# Patient Record
Sex: MALE | Race: WHITE | Employment: OTHER | URBAN - NONMETROPOLITAN AREA
[De-identification: names, ages, dates, MRNs, and addresses within clinical notes are randomized per-mention and may not be internally consistent; named-entity substitution may affect disease eponyms.]

---

## 2021-12-09 ENCOUNTER — TELEPHONE (OUTPATIENT)
Dept: PAIN MANAGEMENT | Age: 56
End: 2021-12-09

## 2021-12-09 RX ORDER — MAGNESIUM OXIDE 400 MG/1
400 TABLET ORAL DAILY
COMMUNITY

## 2021-12-09 RX ORDER — INSULIN GLARGINE 100 [IU]/ML
40 INJECTION, SOLUTION SUBCUTANEOUS DAILY
COMMUNITY

## 2021-12-09 RX ORDER — PANTOPRAZOLE SODIUM 40 MG/1
40 GRANULE, DELAYED RELEASE ORAL
COMMUNITY

## 2021-12-09 RX ORDER — FUROSEMIDE 20 MG/1
20 TABLET ORAL 2 TIMES DAILY
COMMUNITY

## 2021-12-09 RX ORDER — ATORVASTATIN CALCIUM 80 MG/1
80 TABLET, FILM COATED ORAL DAILY
COMMUNITY

## 2021-12-09 RX ORDER — ISOSORBIDE MONONITRATE 60 MG/1
60 TABLET, EXTENDED RELEASE ORAL DAILY
COMMUNITY

## 2021-12-09 RX ORDER — INSULIN ASPART 100 [IU]/ML
12 INJECTION, SUSPENSION SUBCUTANEOUS 3 TIMES DAILY
COMMUNITY

## 2021-12-09 NOTE — TELEPHONE ENCOUNTER
Patient was referred by SUDARSHAN Rebollar CNP for evaluation and treatment of lumbar radiculopathy and lumbar degenerative disc disease. We will have to obtain MRI patient had done at [facility] of Lumbar Spine, as it states in PCP referral note. OAR[place] Report checked for [place], [place], and [place]:12/07/2021 Alprazolam 0.5mg #1.00. Due 12/07/2021. Will call and schedule new patient visit.

## 2021-12-20 ENCOUNTER — OFFICE VISIT (OUTPATIENT)
Dept: PAIN MANAGEMENT | Age: 56
End: 2021-12-20
Payer: MEDICARE

## 2021-12-20 ENCOUNTER — TELEPHONE (OUTPATIENT)
Dept: PAIN MANAGEMENT | Age: 56
End: 2021-12-20

## 2021-12-20 VITALS
BODY MASS INDEX: 25.46 KG/M2 | WEIGHT: 168 LBS | DIASTOLIC BLOOD PRESSURE: 82 MMHG | SYSTOLIC BLOOD PRESSURE: 138 MMHG | HEIGHT: 68 IN

## 2021-12-20 DIAGNOSIS — S39.012A ACUTE MYOFASCIAL STRAIN OF LUMBAR REGION, INITIAL ENCOUNTER: ICD-10-CM

## 2021-12-20 DIAGNOSIS — E11.42 DIABETIC PERIPHERAL NEUROPATHY (HCC): ICD-10-CM

## 2021-12-20 DIAGNOSIS — M51.36 DDD (DEGENERATIVE DISC DISEASE), LUMBAR: ICD-10-CM

## 2021-12-20 DIAGNOSIS — M47.817 LUMBOSACRAL SPONDYLOSIS WITHOUT MYELOPATHY: ICD-10-CM

## 2021-12-20 DIAGNOSIS — M54.16 LUMBAR RADICULOPATHY: Primary | ICD-10-CM

## 2021-12-20 PROCEDURE — 99215 OFFICE O/P EST HI 40 MIN: CPT | Performed by: PHYSICAL MEDICINE & REHABILITATION

## 2021-12-20 PROCEDURE — G8427 DOCREV CUR MEDS BY ELIG CLIN: HCPCS | Performed by: PHYSICAL MEDICINE & REHABILITATION

## 2021-12-20 PROCEDURE — 99204 OFFICE O/P NEW MOD 45 MIN: CPT | Performed by: PHYSICAL MEDICINE & REHABILITATION

## 2021-12-20 PROCEDURE — 3046F HEMOGLOBIN A1C LEVEL >9.0%: CPT | Performed by: PHYSICAL MEDICINE & REHABILITATION

## 2021-12-20 PROCEDURE — G8484 FLU IMMUNIZE NO ADMIN: HCPCS | Performed by: PHYSICAL MEDICINE & REHABILITATION

## 2021-12-20 PROCEDURE — 20552 NJX 1/MLT TRIGGER POINT 1/2: CPT | Performed by: PHYSICAL MEDICINE & REHABILITATION

## 2021-12-20 PROCEDURE — 4004F PT TOBACCO SCREEN RCVD TLK: CPT | Performed by: PHYSICAL MEDICINE & REHABILITATION

## 2021-12-20 PROCEDURE — 2022F DILAT RTA XM EVC RTNOPTHY: CPT | Performed by: PHYSICAL MEDICINE & REHABILITATION

## 2021-12-20 PROCEDURE — 3017F COLORECTAL CA SCREEN DOC REV: CPT | Performed by: PHYSICAL MEDICINE & REHABILITATION

## 2021-12-20 PROCEDURE — G8419 CALC BMI OUT NRM PARAM NOF/U: HCPCS | Performed by: PHYSICAL MEDICINE & REHABILITATION

## 2021-12-20 RX ORDER — METOPROLOL SUCCINATE 25 MG/1
1 TABLET, EXTENDED RELEASE ORAL 2 TIMES DAILY
COMMUNITY
Start: 2021-08-27

## 2021-12-20 ASSESSMENT — ENCOUNTER SYMPTOMS
EYES NEGATIVE: 1
ALLERGIC/IMMUNOLOGIC NEGATIVE: 1
BACK PAIN: 1
CONSTIPATION: 0
NAUSEA: 0
RESPIRATORY NEGATIVE: 1

## 2021-12-20 NOTE — LETTER
[address] A department of [place] [address]  Phone: [phone]  Fax: [phone]    Dianne Judd MD    December 20, 2021     Carlton Greenwood, APRN - CNP  [address]  [address] [address]    Patient: Nhi England   MR Number: [id number]   YOB: 1965   Date of Visit: 12/20/2021       Dear Carlton Greenwood:    Thank you for referring Nhi England to me for evaluation/treatment. Below are the relevant portions of my assessment and plan of care. If you have questions, please do not hesitate to call me. I look forward to following Vimal Grnat along with you.     Sincerely,      Dianne Judd MD

## 2021-12-20 NOTE — TELEPHONE ENCOUNTER
YOHANA with [facility] radiology for most recent MRI results to be faxed to dr. Dillan Jaime.

## 2021-12-20 NOTE — PROGRESS NOTES
PAIN MANAGEMENTNEW PATIENT CONSULTATION  12/20/21    Debby Rubin  1965    ReferringProvider:  No referring provider defined for this encounter. Primary Care Physician:  Mykel LANE, APRN - CNP  [address] [facility] [address]    HPIinformation obtained from the patient as well as the Comprehensive Pain ManagementHealth Questionnaire filled out by the patient. The questionnaire will be scannedinto the electronic chart and PMH, PSH, meds, and allergies will be updates accordingly. Subjective:       CHIEF COMPLAINT:  This is a63 y.o. male patientwho presents with a complaint of New Patient (referred by [employer] for lumbar radiculopathy) and Pain (lower back, right hip and right leg pain. )      PAIN HPI:    Pain in right gluteal for 4 months no new injury   Hurts to stand  Walk    Even lying con hurt      Location: Back  Location Modifier: -  Severity of Pain: 3  Duration of Pain: Intermittent  Frequency of Pain: Intermittent  Aggravating Factors: Stretching, Bending, Straightening, Standing, Walking, Stairs, Exercise, Kneeling and Squatting  Limiting Behavior: no  Relieving Factors: Heat, Cold and Medication -  Result of Injury: no  Work Related Injury: no  [place] of Worker Compensation Case: no      Prior evaluation:    Previous lower back problems:No    Previous workup: No   History of surgery in painful area:No    Previous pain medication trials have included:       Opioids: -     NSAID's:-     Muscle relaxants: -     Neuropathicpain meds: -    Previous Pain Management Physician: No   Nerve blocks, spinal injections:No   Typeof Pain Intervention . Idania Navarrete Date of last Pain Intervention  January /2030   Was there pain relief from Pain Intervention: No.    How long was your pain relief from the Pain Intervention.   Sleep:       Difficultyfalling asleep:  No   Takes sleepingmedication: No    Mental health:    Patient feels - secondary to their current pain problems as described above. H/O depression and anxiety: No   Patient is not seeing psychologist orpsychiatrist   Abuse history? .    Employed? No  Alcohol use?: No  Tobacco use?: No  Marijuana use?: No  Illicit drug use?: No    Imaging: Reviewed available imagingin our system with the patient. No results found. Referring physician records reviewed. Review of Systems   Constitutional: Positive for fatigue. HENT: Negative. Eyes: Negative. Respiratory: Negative. Cardiovascular: Negative. Gastrointestinal: Negative for constipation and nausea. Endocrine: Negative. Genitourinary: Negative for difficulty urinating. Musculoskeletal: Positive for arthralgias, back pain and myalgias. Skin: Negative. Allergic/Immunologic: Negative. Neurological: Positive for weakness and numbness. Hematological: Negative. Psychiatric/Behavioral: Positive for sleep disturbance. All other systems reviewed and are negative. No Known Allergies    Outpatient Medications Prior to Visit   Medication Sig Dispense Refill    metoprolol succinate (TOPROL XL) 25 MG extended release tablet Take 1 tablet by mouth 2 times daily      ASPIRIN 81 PO Take 1 tablet by mouth daily      atorvastatin (LIPITOR) 80 MG tablet Take 80 mg by mouth daily      furosemide (LASIX) 20 MG tablet Take 20 mg by mouth 2 times daily      insulin aspart protamine-insulin aspart (NOVOLOG 70/30 FLEXPEN RELION) (70-30) 100 UNIT/ML injection Inject 6 Units into the skin 3 times daily      insulin glargine (LANTUS) 100 UNIT/ML injection vial Inject 30 Units into the skin daily      isosorbide mononitrate (IMDUR) 60 MG extended release tablet Take 60 mg by mouth daily      magnesium oxide (MAG-OX) 400 MG tablet Take 400 mg by mouth daily      pantoprazole sodium (PROTONIX) 40 MG PACK packet Take 40 mg by mouth every morning (before breakfast)       No facility-administered medications prior to visit.        Past Medical History: Diagnosis Date    Anxiety due to invasive procedure     CAD (coronary artery disease)     [facility]     DDD (degenerative disc disease), lumbar     DM (diabetes mellitus), type 1 with hyperosmolarity ([facility])     HTN (hypertension)     Hyperlipidemia     Ischemic cardiomyopathy     Lumbar radiculopathy     Sciatica        Past Surgical History:   Procedure Laterality Date    CARDIAC SURGERY      S/P Triple Bypass     CARPAL TUNNEL RELEASE      CORONARY ANGIOPLASTY WITH STENT PLACEMENT         Family History   Problem Relation Age of Onset    Heart Disease Mother     Diabetes Father      Social History     Socioeconomic History    Marital status: Single     Spouse name: None    Number of children: None    Years of education: None    Highest education level: None   Occupational History    None   Tobacco Use    Smoking status: Current Every Day Smoker     Packs/day: 1.00     Types: Cigarettes    Smokeless tobacco: Never Used   Substance and Sexual Activity    Alcohol use: Not Currently     Comment: Montly or Less    Drug use: Never    Sexual activity: None   Other Topics Concern    None   Social History Narrative    None     Social Determinants of Health     Financial Resource Strain:     Difficulty of Paying Living Expenses: Not on file   Food Insecurity:     Worried About Running Out of Food in the Last Year: Not on file    Mike of Food in the Last Year: Not on file   Transportation Needs:     Lack of Transportation (Medical): Not on file    Lack of Transportation (Non-Medical):  Not on file   Physical Activity:     Days of Exercise per Week: Not on file    Minutes of Exercise per Session: Not on file   Stress:     Feeling of Stress : Not on file   Social Connections:     Frequency of Communication with Friends and Family: Not on file    Frequency of Social Gatherings with Friends and Family: Not on file    Attends Catholic Services: Not on file   [facility] Active Member of Clubs or Organizations: Not on file    Attends Club or Organization Meetings: Not on file    Marital Status: Not on file   Intimate Partner Violence:     Fear of Current or Ex-Partner: Not on file    Emotionally Abused: Not on file    Physically Abused: Not on file    Sexually Abused: Not on file   Housing Stability:     Unable to Pay for Housing in the Last Year: Not on file    Number of [place] in the Last Year: Not on file    Unstable Housing in the Last Year: Not on file         Objective:     Physical Exam:  Vitals:    12/20/21 1530   BP: 138/82   Weight: 168 lb (76.2 kg)   Height: 5' 8\" (1.727 m)          Physical Exam  Constitutional:       Appearance: He is well-developed. HENT:      Head: Normocephalic and atraumatic. Cardiovascular:      Pulses: Normal pulses. Comments: Warm extremities. Normal capillary refill. Pulmonary:      Effort: Pulmonary effort is normal.   Abdominal:      General: Abdomen is flat. Palpations: Abdomen is soft. Musculoskeletal:      Lumbar back: Negative right straight leg raise test and negative left straight leg raise test.   Skin:     General: Skin is warm and dry. Neurological:      General: No focal deficit present. Mental Status: He is alert and oriented to person, place, and time. Cranial Nerves: No cranial nerve deficit. Sensory: No sensory deficit. Motor: No atrophy or abnormal muscle tone. Deep Tendon Reflexes: Reflexes are normal and symmetric.    Psychiatric:         Mood and Affect: Mood normal.         Speech: Speech normal.         Behavior: Behavior normal.         Back Exam     Tenderness   Back tenderness location: +slump Right  - kemps  Darrel     Range of Motion   Extension: normal   Flexion: normal   Lateral bend right: normal   Lateral bend left: normal   Rotation right: normal   Rotation left: normal     Muscle Strength   Right Quadriceps:  5/5   Left Quadriceps:  5/5   Right Hamstrings: 5/5   Left Hamstrings:  5/5     Tests   Straight leg raise right: negative  Straight leg raise left: negative    Other   Toe walk: normal  Heel walk: normal  Sensation: normal  Gait: normal              Labs: No results found for: WBC, HGB, HCT, PLT, CHOL, TRIG, HDL, LDLDIRECT, ALT, AST, NA, K, CL, CREATININE, BUN, CO2, TSH, PSA, INR, GLUF, LABA1C, LABMICR    Assessment: This is a 61 y.o. male with the following diagnosis:     Pain Diagnoses:  1. Lumbar radiculopathy    2. Lumbosacral spondylosis without myelopathy    3. DDD (degenerative disc disease), lumbar    4. Diabetic peripheral neuropathy [facility])        Medical/ Psychological Comorbidities:  As listed in the past medical and surgical history    Functional Limitations secondary to the above problems:  Chronic painlimits function and quality of life    Plan:     1. obtan Lumbar  To eval severity of Lumbar spinal stenosis,    2. Interventional  Spine Surgeries , more PT  Or Spine Surgeon  eval    Meds:   New Prescriptions    No medications on file      No orders of the defined types were placed in this encounter. Controlled Substances Monitoring:    OARRS report was reviewed for [place], [place]. Pt educated about the risks of taking opiates, including increasedsedation, constipation, slowed breathing, tolerance, dependence, and addiction. No orders of the defined types were placed in this encounter. Return in about 1 week (around 12/27/2021) for intervention procedure R ight L,4 ,L5 TFE . The patient expressed understanding of the above assessment and plan. Totaltime spent face to face with patient was 25 minutes inwhich  50% or more of the time was spent in counseling, education about risk andbenefits of the above plan, and coordination of care.

## 2021-12-20 NOTE — Clinical Note
[address] Pain Management A department of [place] [address]  Phone: [phone]  Fax: [phone]    Trent Seals MD        December 20, 2021     Patient: Cristofer Ames   YOB: 1965   Date of Visit: 12/20/2021       To Whom It May Concern: It is my medical opinion that Cristofer Ames {Work release (duty restriction):62269}. If you have any questions or concerns, please don't hesitate to call.     Sincerely,        Trent Seals MD

## 2021-12-20 NOTE — PROGRESS NOTES
Mansoor Calzada  1965  12/20/21      PAIN MANAGEMENT CLINIC PROCEDURE NOTE    CHIEF COMPLAINT: This is a 61 y.o. male patient who presents to the Pain Management Clinic with a history of pain in the Right gluteal .    PRE-PROCEDURE DIAGNOSIS: Right.lumbar pain myofascial     POST-PROCEDURE DIAGNOSIS: same as pre-procedure diagnosis    Vitals:    12/20/21 1530   BP: 138/82            Trigger points were found in the right:   .5. LUMBAR PARASPINALS  6. GLUTEAL MYOFASCIA  . PROCEDURE:     The procedure was explained, including risks and benefits, and the patient has agreed to proceed. The injection site was marked on the patients skin. A large area around the injection site was cleaned with chlora-prep. Pinky Angles TRIGGER POINT INJECTIONS  A 25G 1.5 inch needle was inserted into each muscle. Depth and direction of the needle were monitored at all times. The needle was advanced until a muscle twitch response was felt and the patient reported concordant pain. The syringe was aspirated and was negative for heme or air. Then, a combination of 1ml of 2%lidocaine, and 10mg of kenalog (NDC# 5234-6003-99) was injected. The needle was then moved in and out of the muscle at the same depth to break up additional muscle spasms. A total of 2 trigger points were injected. .The injection site was cleaned and dressed with a spot band aid. The patient tolerated the procedure well and with out complication The patient was instructed avoid heat and excessive activity for 24-48 hours.

## 2021-12-20 NOTE — PROGRESS NOTES
PAIN MANAGEMENTNEW PATIENT CONSULTATION  12/20/21    Comfort Gallardo  1965    ReferringProvider:  No referring provider defined for this encounter. Primary Care Physician:  Karl LANE, APRN - CNP  [address] [facility] [address]    HPIinformation obtained from the patient as well as the Comprehensive Pain ManagementHealth Questionnaire filled out by the patient. The questionnaire will be scannedinto the electronic chart and PMH, PSH, meds, and allergies will be updates accordingly. Subjective:       CHIEF COMPLAINT:  This is a63 y.o. male patientwho presents with a complaint of New Patient (referred by [employer] for lumbar radiculopathy) and Pain (lower back, right hip and right leg pain. )      PAIN HPI:    jPain in  Right mid gluteal to  Right   Lateral leg to knee   better to  Lay down      Location: Back  Location Modifier: Right  Severity of Pain: 3  Duration of Pain: Intermittent  Frequency of Pain: Intermittent  Aggravating Factors: Stretching, Bending, Straightening, Standing, Walking, Stairs, Exercise, Kneeling and Squatting  Limiting Behavior: yes - . Relieving Factors: Heat, Cold and Medication -  Result of Injury: no  Work Related Injury: no  [place] of Worker Compensation Case: no      Prior evaluation:    Previous lower back problems:No    Previous workup: No   History of surgery in painful area:No    Previous pain medication trials have included:       Opioids: -     NSAID's:-     Muscle relaxants: -     Neuropathicpain meds: -    Previous Pain Management Physician: No   Nerve blocks, spinal injections:No   Typeof Pain Intervention -. Date of last Pain Intervention  January /2030   Was there pain relief from Pain Intervention: No.    How long was your pain relief from the Pain Intervention 3months.      Sleep:       Difficultyfalling asleep:  No   Takes sleepingmedication: No    Mental health:    Patient feels - secondary to their current pain problems as Diagnosis Date    Anxiety due to invasive procedure     CAD (coronary artery disease)     [facility]     DDD (degenerative disc disease), lumbar     DM (diabetes mellitus), type 1 with hyperosmolarity ([facility])     HTN (hypertension)     Hyperlipidemia     Ischemic cardiomyopathy     Lumbar radiculopathy     Sciatica        Past Surgical History:   Procedure Laterality Date    CARDIAC SURGERY      S/P Triple Bypass     CARPAL TUNNEL RELEASE      CORONARY ANGIOPLASTY WITH STENT PLACEMENT         Family History   Problem Relation Age of Onset    Heart Disease Mother     Diabetes Father      Social History     Socioeconomic History    Marital status: Single     Spouse name: None    Number of children: None    Years of education: None    Highest education level: None   Occupational History    None   Tobacco Use    Smoking status: Current Every Day Smoker     Packs/day: 1.00     Types: Cigarettes    Smokeless tobacco: Never Used   Substance and Sexual Activity    Alcohol use: Not Currently     Comment: Montly or Less    Drug use: Never    Sexual activity: None   Other Topics Concern    None   Social History Narrative    None     Social Determinants of Health     Financial Resource Strain:     Difficulty of Paying Living Expenses: Not on file   Food Insecurity:     Worried About Running Out of Food in the Last Year: Not on file    Mike of Food in the Last Year: Not on file   Transportation Needs:     Lack of Transportation (Medical): Not on file    Lack of Transportation (Non-Medical):  Not on file   Physical Activity:     Days of Exercise per Week: Not on file    Minutes of Exercise per Session: Not on file   Stress:     Feeling of Stress : Not on file   Social Connections:     Frequency of Communication with Friends and Family: Not on file    Frequency of Social Gatherings with Friends and Family: Not on file    Attends Hoahaoism Services: Not on file   [place]ristal Cisneros Active Member of Clubs or Organizations: Not on file    Attends Club or Organization Meetings: Not on file    Marital Status: Not on file   Intimate Partner Violence:     Fear of Current or Ex-Partner: Not on file    Emotionally Abused: Not on file    Physically Abused: Not on file    Sexually Abused: Not on file   Housing Stability:     Unable to Pay for Housing in the Last Year: Not on file    Number of [place] in the Last Year: Not on file    Unstable Housing in the Last Year: Not on file         Objective:     Physical Exam:  Vitals:    12/20/21 1530   BP: 138/82   Weight: 168 lb (76.2 kg)   Height: 5' 8\" (1.727 m)          Physical Exam  Constitutional:       Appearance: He is well-developed. HENT:      Head: Normocephalic and atraumatic. Cardiovascular:      Pulses: Normal pulses. Comments: Warm extremities. Normal capillary refill. Pulmonary:      Effort: Pulmonary effort is normal.   Abdominal:      General: Abdomen is flat. Musculoskeletal:      Lumbar back: Negative right straight leg raise test and negative left straight leg raise test.   Skin:     General: Skin is warm and dry. Neurological:      General: No focal deficit present. Mental Status: He is alert and oriented to person, place, and time. Cranial Nerves: No cranial nerve deficit. Sensory: No sensory deficit. Motor: No atrophy or abnormal muscle tone. Deep Tendon Reflexes: Reflexes are normal and symmetric. Psychiatric:         Mood and Affect: Mood normal.         Speech: Speech normal.         Behavior: Behavior normal.         Back Exam     Tenderness   The patient is experiencing tenderness in the lumbar (+slump Right - kemps ).     Range of Motion   Extension: normal   Flexion: normal   Lateral bend right: normal   Lateral bend left: normal   Rotation right: normal   Rotation left: normal     Muscle Strength   Right Quadriceps:  5/5   Left Quadriceps:  5/5   Right Hamstrings:  5/5   Left Hamstrings:  5/5     Tests   Straight leg raise right: negative  Straight leg raise left: negative    Other   Toe walk: normal  Heel walk: normal  Sensation: normal  Gait: normal              Labs: No results found for: WBC, HGB, HCT, PLT, CHOL, TRIG, HDL, LDLDIRECT, ALT, AST, NA, K, CL, CREATININE, BUN, CO2, TSH, PSA, INR, GLUF, LABA1C, LABMICR    Assessment: This is a 61 y.o. male with the following diagnosis:     Pain Diagnoses:  1. Lumbar radiculopathy    2. Lumbosacral spondylosis without myelopathy    3. DDD (degenerative disc disease), lumbar    4. Diabetic peripheral neuropathy [facility])        Medical/ Psychological Comorbidities:  As listed in the past medical and surgical history    Functional Limitations secondary to the above problems:  Chronic painlimits function and quality of life    Plan:   Myofascial injection Right gluteal Deltasone 20 Q D 10 day #10 no refills stop if BS over 200  1. Will order   Right l,4 L5 TFE x2  Meds:   New Prescriptions    No medications on file      No orders of the defined types were placed in this encounter. Controlled Substances Monitoring:    OARRS report was reviewed for [place], [place]. Pt educated about the risks of taking opiates, including increasedsedation, constipation, slowed breathing, tolerance, dependence, and addiction. No orders of the defined types were placed in this encounter. No follow-ups on file. The patient expressed understanding of the above assessment and plan. Totaltime spent face to face with patient was 25 minutes inwhich  50% or more of the time was spent in counseling, education about risk andbenefits of the above plan, and coordination of care.

## 2021-12-30 NOTE — TELEPHONE ENCOUNTER
Called patient   Will keep  Lumbar Lilliana  as scheduled Jan 7 with me   And has Jan 25 Spine Surgeon   OV in  [facility].   Thank You

## 2022-01-07 ENCOUNTER — APPOINTMENT (OUTPATIENT)
Dept: GENERAL RADIOLOGY | Age: 57
End: 2022-01-07
Attending: PHYSICAL MEDICINE & REHABILITATION
Payer: MEDICARE

## 2022-01-07 ENCOUNTER — HOSPITAL ENCOUNTER (OUTPATIENT)
Age: 57
Setting detail: OUTPATIENT SURGERY
Discharge: HOME | End: 2022-01-07
Attending: PHYSICAL MEDICINE & REHABILITATION | Admitting: PHYSICAL MEDICINE & REHABILITATION
Payer: MEDICARE

## 2022-01-07 VITALS
TEMPERATURE: 97.3 F | RESPIRATION RATE: 16 BRPM | BODY MASS INDEX: 25.46 KG/M2 | SYSTOLIC BLOOD PRESSURE: 118 MMHG | WEIGHT: 168 LBS | HEIGHT: 68 IN | HEART RATE: 74 BPM | OXYGEN SATURATION: 96 % | DIASTOLIC BLOOD PRESSURE: 58 MMHG

## 2022-01-07 PROBLEM — M43.06 LUMBAR SPONDYLOLYSIS: Status: ACTIVE | Noted: 2022-01-07

## 2022-01-07 PROBLEM — M54.16 LUMBAR RADICULITIS: Status: ACTIVE | Noted: 2022-01-07

## 2022-01-07 PROCEDURE — 7100000010 HC PHASE II RECOVERY - FIRST 15 MIN: Performed by: PHYSICAL MEDICINE & REHABILITATION

## 2022-01-07 PROCEDURE — 2500000003 HC RX 250 WO HCPCS: Performed by: PHYSICAL MEDICINE & REHABILITATION

## 2022-01-07 PROCEDURE — 64483 NJX AA&/STRD TFRM EPI L/S 1: CPT | Performed by: PHYSICAL MEDICINE & REHABILITATION

## 2022-01-07 PROCEDURE — 6360000004 HC RX CONTRAST MEDICATION: Performed by: PHYSICAL MEDICINE & REHABILITATION

## 2022-01-07 PROCEDURE — 64484 NJX AA&/STRD TFRM EPI L/S EA: CPT | Performed by: PHYSICAL MEDICINE & REHABILITATION

## 2022-01-07 PROCEDURE — 3600000056 HC PAIN LEVEL 4 BASE: Performed by: PHYSICAL MEDICINE & REHABILITATION

## 2022-01-07 PROCEDURE — 3209999900 FLUORO FOR SURGICAL PROCEDURES

## 2022-01-07 PROCEDURE — 2709999900 HC NON-CHARGEABLE SUPPLY: Performed by: PHYSICAL MEDICINE & REHABILITATION

## 2022-01-07 PROCEDURE — 2580000003 HC RX 258: Performed by: PHYSICAL MEDICINE & REHABILITATION

## 2022-01-07 PROCEDURE — 6360000002 HC RX W HCPCS: Performed by: PHYSICAL MEDICINE & REHABILITATION

## 2022-01-07 RX ORDER — DEXAMETHASONE SODIUM PHOSPHATE 10 MG/ML
INJECTION INTRAMUSCULAR; INTRAVENOUS PRN
Status: DISCONTINUED | OUTPATIENT
Start: 2022-01-07 | End: 2022-01-07 | Stop reason: ALTCHOICE

## 2022-01-07 RX ORDER — BUPIVACAINE HYDROCHLORIDE 5 MG/ML
INJECTION, SOLUTION EPIDURAL; INTRACAUDAL PRN
Status: DISCONTINUED | OUTPATIENT
Start: 2022-01-07 | End: 2022-01-07 | Stop reason: ALTCHOICE

## 2022-01-07 RX ORDER — SODIUM CHLORIDE 9 MG/ML
INJECTION INTRAVENOUS PRN
Status: DISCONTINUED | OUTPATIENT
Start: 2022-01-07 | End: 2022-01-07 | Stop reason: ALTCHOICE

## 2022-01-07 ASSESSMENT — PAIN DESCRIPTION - PAIN TYPE: TYPE: SURGICAL PAIN

## 2022-01-07 ASSESSMENT — ENCOUNTER SYMPTOMS
BACK PAIN: 1
NAUSEA: 0
ALLERGIC/IMMUNOLOGIC NEGATIVE: 1
EYES NEGATIVE: 1
CONSTIPATION: 0
RESPIRATORY NEGATIVE: 1

## 2022-01-07 ASSESSMENT — PAIN SCALES - GENERAL: PAINLEVEL_OUTOF10: 4

## 2022-01-07 ASSESSMENT — PAIN DESCRIPTION - ORIENTATION: ORIENTATION: RIGHT

## 2022-01-07 ASSESSMENT — PAIN - FUNCTIONAL ASSESSMENT: PAIN_FUNCTIONAL_ASSESSMENT: 0-10

## 2022-01-07 ASSESSMENT — PAIN DESCRIPTION - LOCATION: LOCATION: BACK

## 2022-01-07 NOTE — H&P
PAIN MANAGEMENTNEW PATIENT CONSULTATION  12/20/21    Ashleemorgan Smith  1965    ReferringProvider:  No referring provider defined for this encounter. Primary Care Physician:  Anjelica LANE, APRN - CNP  [address] [facility] [address]    HPIinformation obtained from the patient as well as the Comprehensive Pain ManagementHealth Questionnaire filled out by the patient. The questionnaire will be scannedinto the electronic chart and PMH, PSH, meds, and allergies will be updates accordingly. Subjective:       CHIEF COMPLAINT:  This is a63 y.o. male patientwho presents with a complaint of No chief complaint on file. PAIN HPI:    jPain in  Right mid gluteal to  Right   Lateral leg to knee   better to  Lay down      Location: Back  Location Modifier: Right  Severity of Pain: 3  Duration of Pain: Intermittent  Frequency of Pain: Intermittent  Aggravating Factors: Stretching, Bending, Straightening, Standing, Walking, Stairs, Exercise, Kneeling and Squatting  Limiting Behavior: yes - . Relieving Factors: Heat, Cold and Medication -  Result of Injury: no  Work Related Injury: no  [place] of Worker Compensation Case: no      Prior evaluation:    Previous lower back problems:No    Previous workup: No   History of surgery in painful area:No    Previous pain medication trials have included:       Opioids: -     NSAID's:-     Muscle relaxants: -     Neuropathicpain meds: -    Previous Pain Management Physician: No   Nerve blocks, spinal injections:No   Typeof Pain Intervention -. Date of last Pain Intervention  January /2030   Was there pain relief from Pain Intervention: No.    How long was your pain relief from the Pain Intervention 3months. Sleep:       Difficultyfalling asleep:  No   Takes sleepingmedication: No    Mental health:    Patient feels - secondary to their current pain problems as described above.    H/O depression and anxiety: No   Patient is not seeing psychologist orpsychiatrist   Abuse history? .    Employed? No  Alcohol use?: No  Tobacco use?: No  Marijuana use?: No  Illicit drug use?: No    Imaging: Reviewed available imagingin our system with the patient. No results found. Referring physician records reviewed. Review of Systems   Constitutional: Positive for fatigue. HENT: Negative. Eyes: Negative. Respiratory: Negative. Cardiovascular: Negative. Gastrointestinal: Negative for constipation and nausea. Endocrine: Negative. Genitourinary: Negative for difficulty urinating. Musculoskeletal: Positive for arthralgias, back pain and myalgias. Skin: Negative. Allergic/Immunologic: Negative. Neurological: Positive for weakness and numbness. Hematological: Negative. Psychiatric/Behavioral: Positive for sleep disturbance. All other systems reviewed and are negative. No Known Allergies    Outpatient Medications Prior to Visit   Medication Sig Dispense Refill    metoprolol succinate (TOPROL XL) 25 MG extended release tablet Take 1 tablet by mouth 2 times daily      ASPIRIN 81 PO Take 1 tablet by mouth daily      atorvastatin (LIPITOR) 80 MG tablet Take 80 mg by mouth daily      furosemide (LASIX) 20 MG tablet Take 20 mg by mouth 2 times daily      insulin aspart protamine-insulin aspart (NOVOLOG 70/30 FLEXPEN RELION) (70-30) 100 UNIT/ML injection Inject 6 Units into the skin 3 times daily      insulin glargine (LANTUS) 100 UNIT/ML injection vial Inject 30 Units into the skin daily      isosorbide mononitrate (IMDUR) 60 MG extended release tablet Take 60 mg by mouth daily      magnesium oxide (MAG-OX) 400 MG tablet Take 400 mg by mouth daily      pantoprazole sodium (PROTONIX) 40 MG PACK packet Take 40 mg by mouth every morning (before breakfast)       No facility-administered medications prior to visit.        Past Medical History:   Diagnosis Date    Anxiety due to invasive procedure     CAD (coronary artery disease)     [facility]  CHF (congestive heart failure) (HCC)     DDD (degenerative disc disease), lumbar     DM (diabetes mellitus), type 1 with hyperosmolarity (HCC)     HTN (hypertension)     Hyperlipidemia     Ischemic cardiomyopathy     Lumbar radiculopathy     Sciatica        Past Surgical History:   Procedure Laterality Date    CARDIAC SURGERY      S/P Triple Bypass     CARPAL TUNNEL RELEASE      CORONARY ANGIOPLASTY WITH STENT PLACEMENT         Family History   Problem Relation Age of Onset    Heart Disease Mother     Diabetes Father      Social History     Socioeconomic History    Marital status: Single     Spouse name: Not on file    Number of children: Not on file    Years of education: Not on file    Highest education level: Not on file   Occupational History    Not on file   Tobacco Use    Smoking status: Current Every Day Smoker     Packs/day: 1.00     Types: Cigarettes    Smokeless tobacco: Never Used   Substance and Sexual Activity    Alcohol use: Not Currently     Comment: [place] or Less    Drug use: Never    Sexual activity: Not on file   Other Topics Concern    Not on file   Social History Narrative    Not on file     Social Determinants of Health     Financial Resource Strain:     Difficulty of Paying Living Expenses: Not on file   Food Insecurity:     Worried About Running Out of Food in the Last Year: Not on file    Mike of Food in the Last Year: Not on file   Transportation Needs:     Lack of Transportation (Medical): Not on file    Lack of Transportation (Non-Medical):  Not on file   Physical Activity:     Days of Exercise per Week: Not on file    Minutes of Exercise per Session: Not on file   Stress:     Feeling of Stress : Not on file   Social Connections:     Frequency of Communication with Friends and Family: Not on file    Frequency of Social Gatherings with Friends and Family: Not on file    Attends Protestant Services: Not on file   [employer] of Clubs or Organizations: Not on file    Attends Club or Organization Meetings: Not on file    Marital Status: Not on file   Intimate Partner Violence:     Fear of Current or Ex-Partner: Not on file    Emotionally Abused: Not on file    Physically Abused: Not on file    Sexually Abused: Not on file   Housing Stability:     Unable to Pay for Housing in the Last Year: Not on file    Number of [place] in the Last Year: Not on file    Unstable Housing in the Last Year: Not on file         Objective:     Physical Exam:  Vitals:    01/07/22 1013   BP: 117/60   Pulse: 78   Resp: 16   Temp: 98.3 °F (36.8 °C)   TempSrc: Oral   SpO2: 95%   Weight: 168 lb (76.2 kg)   Height: 5' 8\" (1.727 m)          Physical Exam  Constitutional:       Appearance: He is well-developed. HENT:      Head: Normocephalic and atraumatic. Cardiovascular:      Pulses: Normal pulses. Comments: Warm extremities. Normal capillary refill. Pulmonary:      Effort: Pulmonary effort is normal.   Abdominal:      General: Abdomen is flat. Musculoskeletal:      Lumbar back: Negative right straight leg raise test and negative left straight leg raise test.   Skin:     General: Skin is warm and dry. Neurological:      General: No focal deficit present. Mental Status: He is alert and oriented to person, place, and time. Cranial Nerves: No cranial nerve deficit. Sensory: No sensory deficit. Motor: No atrophy or abnormal muscle tone. Deep Tendon Reflexes: Reflexes are normal and symmetric. Psychiatric:         Mood and Affect: Mood normal.         Speech: Speech normal.         Behavior: Behavior normal.         Back Exam     Tenderness   The patient is experiencing tenderness in the lumbar (+slump Right - kemps ).     Range of Motion   Extension: normal   Flexion: normal   Lateral bend right: normal   Lateral bend left: normal   Rotation right: normal   Rotation left: normal     Muscle Strength   Right Quadriceps:  5/5 Left Quadriceps:  5/5   Right Hamstrings:  5/5   Left Hamstrings:  5/5     Tests   Straight leg raise right: negative  Straight leg raise left: negative    Other   Toe walk: normal  Heel walk: normal  Sensation: normal  Gait: normal              Labs: No results found for: WBC, HGB, HCT, PLT, CHOL, TRIG, HDL, LDLDIRECT, ALT, AST, NA, K, CL, CREATININE, BUN, CO2, TSH, PSA, INR, GLUF, LABA1C, LABMICR    Assessment: This is a 61 y.o. male with the following diagnosis:     Pain Diagnoses:  No diagnosis found. Medical/ Psychological Comorbidities:  As listed in the past medical and surgical history    Functional Limitations secondary to the above problems:  Chronic painlimits function and quality of life    Plan:   Myofascial injection Right gluteal Deltasone 20 Q D 10 day #10 no refills stop if BS over 200  1. Will order   Right l,4 L5 TFE x2  Meds:   Current Discharge Medication List         No orders of the defined types were placed in this encounter. Controlled Substances Monitoring:    OARRS report was reviewed for [place], [place]. Pt educated about the risks of taking opiates, including increasedsedation, constipation, slowed breathing, tolerance, dependence, and addiction.     Orders Placed This Encounter   Procedures    FLUORO FOR SURGICAL PROCEDURES     Standing Status:   Standing     Number of Occurrences:   1     Order Specific Question:   Reason for exam:     Answer:   Fluro    Diet NPO Exceptions are: Sips of Water with Meds     Standing Status:   Standing     Number of Occurrences:   1     Order Specific Question:   Exceptions are     Answer:   Sips of Water with Meds    Vital signs per unit routine     Standing Status:   Standing     Number of Occurrences:   1    Notify physician for     Notify physician for pulse less than 50 or greater than 120, respiratory rate less than 12 or greater than 25, temperature greater than 101.3 F (44.0 C), systolic BP less than 90 or greater than 407, diastolic BP less than 50 or greater than 100. Standing Status:   Standing     Number of Occurrences:   1    Up as tolerated     Standing Status:   Standing     Number of Occurrences:   31202    Up with assistance     Standing Status:   Standing     Number of Occurrences:   79789    Weigh patient     Standing Status:   Standing     Number of Occurrences:   1    Verify surgical site confirmation documentation completed     Standing Status:   Standing     Number of Occurrences:   1    Verify discontinuation of anticoagulants/antiplatelets unless otherwise specified by physician     Standing Status:   Standing     Number of Occurrences:   1    Void on call to OR     Standing Status:   Standing     Number of Occurrences:   1    Verify informed consent     Standing Status:   Standing     Number of Occurrences:   1    Verify pre-procedure history and physical completed     Standing Status:   Standing     Number of Occurrences:   1    Full Code     Standing Status:   Standing     Number of Occurrences:   1    Initiate Oxygen Therapy Protocol     Initiate oxygen therapy if the patient has 1) SpO2 is less than 90%, 2) Cyanosis, Chest Pain, Dyspnea, or Altered level of consciousness AND SpO2 checked and it is less than 90%, or 3) patient on Home oxygen. To initiate oxygen therapy: nurse enters RT51 Nasal cannula oxygen order using Per Protocol order mode (if indication Home Oxygen then change L/min to same amount at home and change Wean to Room Air to No). Notify provider if initiate oxygen therapy unless already on Home Oxygen.          Standing Status:   Standing     Number of Occurrences:   4    Pulse Oximetry Spot Check     Standing Status:   Standing     Number of Occurrences:   1    Blood glucose - POCT     If the patient is diabetic     Standing Status:   Standing     Number of Occurrences:   1    Pregnancy, urine POCT     Standing Status:   Standing     Number of Occurrences:   1     No follow-ups on file. The patient expressed understanding of the above assessment and plan. Totaltime spent face to face with patient was 25 minutes inwhich  50% or more of the time was spent in counseling, education about risk andbenefits of the above plan, and coordination of care.

## 2022-01-07 NOTE — INTERVAL H&P NOTE
I have interviewed and examined the patient and reviewed the recent History and Physical.  There have been no changes to the recent H&P documentation. The surgical consent form has been signed. Last anticoagulant medication use was:3-5 days    Premedication taken for contrast allergy? No    Valium taken for oral sedation? No    Outpatient Medications Marked as Taking for the 1/7/22 encounter [facility] Encounter)   Medication Sig Dispense Refill    metoprolol succinate (TOPROL XL) 25 MG extended release tablet Take 1 tablet by mouth 2 times daily      furosemide (LASIX) 20 MG tablet Take 20 mg by mouth 2 times daily      isosorbide mononitrate (IMDUR) 60 MG extended release tablet Take 60 mg by mouth daily      magnesium oxide (MAG-OX) 400 MG tablet Take 400 mg by mouth daily      pantoprazole sodium (PROTONIX) 40 MG PACK packet Take 40 mg by mouth every morning (before breakfast)         The patient understands the planned operation and its associated risks and benefits and agrees to proceed.         Electronically signed by Marie Milan MD on 1/7/2022 at 10:23 AM

## 2022-01-07 NOTE — OP NOTE
TRANSFORAMINAL EPIDURAL STEROID INJECTION    1/7/22  The patient was counseled at length about the risks of evelio Covid-19 during their perioperative period and any recovery window from their procedure. The patient was made aware that evelio Covid-19  may worsen their prognosis for recovering from their procedure  and lend to a higher morbidity and/or mortality risk. All material risks, benefits, and reasonable alternatives including postponing the procedure were discussed. The patient does wish to proceed with the procedure at this time. Surgeon: Arpita Minaya MD    Pre-operative Diagnosis:   Hospital Problems           Last Modified POA    * (Principal) Lumbar radiculitis 1/7/2022 Yes    Lumbar spondylolysis 1/7/2022 Yes          Post-operative Diagnosis: Same    Assistants: none    This is a 61 y.o. male patient with pain in the Back, right leg. Previous treatment and examination findings are noted in the H&P.R L4,5  transforaminal epidural injection has been requested for diagnostic and therapeutic reasons. Conservative treatment was ineffective i.e.: ice, NSAIDS, rest, narcotic medication, chiropractic care, physical therapy and message therapy. Patient is unable to perform the following ADL's: ambulating and grooming     Pain Assessment: 0-10  Pain Level: 7        Pain Location: Back       Last Plain films: 2020      EXAMINATION:  1. RL 4,5 transforaminal radiculogram/epidurogram.   2. RL4,5 transforaminal epidural anesthetic injection. 3. RL4,5 transforaminal epidural steroid injection. CONSENT: Written consent was obtained from the patient on preprinted consent form after explaining the procedure, indications, potential complications and outcomes. Alternative treatments were also discussed. DISCUSSION: The patient was sterilely prepped and draped in the usual fashion in the prone position. Time out was verified for correct patient, side, level and procedure.     SEDATION: No conscious sedation was performed during the procedure. The patient remained awake and conversed throughout the procedure. The patient underwent pulse oximetry and blood pressure monitoring independently by a trained observer, as well as by a physician. PROCEDURE:  Under image-intensifier control, a 22 gauge needle x 5 inch spinal needle was guided successfully into the epidural space employing a posterior lateral/oblique approach. Needle aspiration was negative for heme or CSF. Instillation of  .5 mL of Omnipaque 240 contrast medium opacified the spinal nerve and demonstrated contiguous flow into the  RL 4 epidural space. No vascular spread was noted. Digital subtraction was not employed to evaluate for vascular spread. The patient was monitored for any untoward reaction to contrast medium before proceeding with procedure #2. The patient did not report pain reproduction in a concordant distribution. Following needle position verification, a test dose of .5 mL of sterile lidocaine 0.5% was administered and patient monitored for any adverse effects. Then, 1 mL of   was instilled into the epidural space and the patient's response was again monitored. Finally, Dexamethasone (Decadron 10 mg/mL) 1 ml of 0.5% bupivacaine was then instilled. The patient's response was again monitored. The spinal needle was removed. Instillation of  .5 mL of Omnipaque 240 contrast medium opacified the spinal nerve and demonstrated contiguous flow into the  RL  5 epidural space. No vascular spread was noted. Digital subtraction was not employed to evaluate for vascular spread. The patient was monitored for any untoward reaction to contrast medium before proceeding with procedure #2. The patient did not report pain reproduction in a concordant distribution. Following needle position verification, a test dose of .5 mL of sterile lidocaine 0.5% was administered and patient monitored for any adverse effects.  Then, 1 mL of   was instilled into the epidural space and the patient's response was again monitored. Finally, Dexamethasone (Decadron 10 mg/mL) 1 ml of 0.5% bupivacaine was then instilled. The patient's response was again monitored. The spinal needle was removed. The patient tolerated the procedure well and without complications and was noted to be in stable condition prior to discharge from the procedure center with discharge instructions. EBL: no blood loss    SPECIMEN: none    IMPRESSIONS:  1. R L4,5 transforaminal epidurogram, epidural anesthesia and epidural steroid injection procedures accomplished without incident. RECOMMENDATIONS:  1. Complete and return Post-Procedure Pain and Activity Diary.   2. Contact the [address] for symptom exacerbation, fever or unusual symptoms. 3. Post-procedure care according to verbal and written discharge instructions    POST-PROCEDURE EPIDUROGRAPHY INTERPRETATION:    EXAMINATION: AP, lateral, and oblique views    FLUORO TIME: 21 seconds    DISCUSSION: Spot views of the spine reveal normal alignment and segmentation. Spinal needle is positioned at the RL4,5 neuroforamin. Contrast spreads and outlines the RL4,5 nerve/ neuroforamin and epidural space. The epidurogram reveals excellent contrast flow. Visualized spine reveals See radiology report. Soft tissues reveal no abnormalities. IMPRESSION: RL4,5 transforaminal epidurogram/epineurogram reveals satisfactory needle position and contrast spread.      Electronically signed by Tiffany Mahmood MD on 1/7/2022 at 10:24 AM

## 2022-01-20 ENCOUNTER — OFFICE VISIT (OUTPATIENT)
Dept: PAIN MANAGEMENT | Age: 57
End: 2022-01-20
Payer: MEDICARE

## 2022-01-20 VITALS
SYSTOLIC BLOOD PRESSURE: 124 MMHG | HEIGHT: 68 IN | DIASTOLIC BLOOD PRESSURE: 64 MMHG | WEIGHT: 167 LBS | BODY MASS INDEX: 25.31 KG/M2

## 2022-01-20 DIAGNOSIS — M51.36 DDD (DEGENERATIVE DISC DISEASE), LUMBAR: Primary | ICD-10-CM

## 2022-01-20 DIAGNOSIS — M43.06 LUMBAR SPONDYLOLYSIS: ICD-10-CM

## 2022-01-20 DIAGNOSIS — M54.16 LUMBAR RADICULITIS: ICD-10-CM

## 2022-01-20 PROCEDURE — 4004F PT TOBACCO SCREEN RCVD TLK: CPT | Performed by: NURSE PRACTITIONER

## 2022-01-20 PROCEDURE — G8427 DOCREV CUR MEDS BY ELIG CLIN: HCPCS | Performed by: NURSE PRACTITIONER

## 2022-01-20 PROCEDURE — 99215 OFFICE O/P EST HI 40 MIN: CPT | Performed by: NURSE PRACTITIONER

## 2022-01-20 PROCEDURE — 3017F COLORECTAL CA SCREEN DOC REV: CPT | Performed by: NURSE PRACTITIONER

## 2022-01-20 PROCEDURE — G8484 FLU IMMUNIZE NO ADMIN: HCPCS | Performed by: NURSE PRACTITIONER

## 2022-01-20 PROCEDURE — G8419 CALC BMI OUT NRM PARAM NOF/U: HCPCS | Performed by: NURSE PRACTITIONER

## 2022-01-20 PROCEDURE — 99214 OFFICE O/P EST MOD 30 MIN: CPT | Performed by: NURSE PRACTITIONER

## 2022-01-20 RX ORDER — IBUPROFEN 800 MG/1
800 TABLET ORAL EVERY 6 HOURS PRN
Qty: 120 TABLET | Refills: 3 | Status: SHIPPED | OUTPATIENT
Start: 2022-01-20 | End: 2022-05-09

## 2022-01-20 ASSESSMENT — ENCOUNTER SYMPTOMS
GASTROINTESTINAL NEGATIVE: 1
BACK PAIN: 1
RESPIRATORY NEGATIVE: 1

## 2022-01-20 NOTE — PATIENT INSTRUCTIONS
[facility]  [address]  Telephone [phone]  Fax [phone]      PROCEDURE INSTRUCTIONS FOR  PAIN MANAGEMENT PROCEDURES WITHOUT IV SEDATION    Beatriz Nielsen scheduled to see Dr. Ca Goetz to undergo the following procedure:  Right L4 L5 Transforaminal    Procedure Date: ____1/28/22____  You will receive a phone call the day prior to your procedure to confirm a time of arrival.    Report to the [place] [id number], Registration office on the 1st floor in the hospital, after check in and signing of paper work you will then go to the second floor to the surgery center. 1. Stop the following medications prior to the procedure:    Aspirin 81 mg   Stop blood thinners as directed before the injection, with permission from your cardiologist or primary care physician. We will send a letter to them requesting permission to hold the blood thinners. · Medication___Aspirin 81 mg ___ held for __3-5__ days    If you take Warfarin (Coumadin), you must have your blood drawn for an INR the day before the procedure. INR must be less than 1.5. if not complete prior to check in the procedure this will be drawn at the procedure center prior to having the procedure completed. 2.  Take all routine medications unless otherwise instructed. Ok to take vitamins and antiinflammatory medications    3. EATING & DRINKING:  Ok to eat or drink before the injection - no IV sedation will be used. 4. If you are allergic to contrast or iodine, you must take benadryl and prednisone prior to the injection to prevent an allergic reaction. Follow the directions on the prescription for the times to take the medication. 5. Oral valium can be prescribed if your are anxious about the injections or feel that you can not lay still during the injection. If you take valium, you must have a . Make arrangements for a family member or friend to drive you to the surgery center.   Your [facility] @ [facility]    Aicha Cruz DNP, ANP-BC  Subjective/HPI:     Leny Ordaz is a 39 y.o. female with history of hypertension, migraines with syncope, pseudoseizure, lupus was recently hospitalized for seizure-like activity is here for routine f/u. The patient denies chest pain/ shortness of breath, orthopnea, PND, LE edema, palpitations. Recent medication adjustment by neurology for migraines was taken off of metoprolol placed on propranolol feels that she is doing better with it, blood pressure seems controlled. She has not gone back to driving since her hospitalization. She has also been experiencing some lower extremity edema this has been a waxing and waning symptom however she feels it is more pronounced now. Noting some fluctuations in weight on vital sign flowsheet. I had previously used as needed furosemide with some success on her. She has not used it in quite some time. Specifically denies any type of orthopnea or PND. Echocardiogram in hospital demonstrated normal systolic function. 8/28/21 Hospitalist D/C Note   Raisa Marshall is a 36 y.o.   female who presents with recurrent headache behind her right eye for more than 2 weeks and questionable multiple  ?seizures 3 witnessed by EMS and 1 witnessed in the ED. Per ED staff patient had 15 second seizure witnessed by staff, MD. Nakita Pederson ED MD he thinks that was  not real seizure and  likely she had pseudoseizure but not sure.  Of note patient was seen on 8/27/2020  in the ED for headache and recurrent syncope describing going in and out of consciousness and on 8/24/2021 for headache . She had right greater occipital nerve block on 824/2021 and right supraorbital nerve block on 8/27/2021 and was also started on steroid on 8/27/2021 in the ED visit. Patient has a past medical history of lupus, breast cancer, nephrolithiasis, GERD, colitis, Mariah-Danlos syndrome.     Pseudoseizures ruled out ride must stay in the hospital while you are having the injection done. If they cannot stay, the injection will be rescheduled. The valium may affect your judgment following the procedure and driving a vehicle within 24 hours after the sedation could be dangerous. 6.  Wear simple loose clothing, which can be easily changed. 7.  Leave jewelry (including rings) and other valuables at home. 8.  You will be asked to sign several forms prior to surgery; patients under the age of 25 must have a parent or legal guardian sign the permit to be able to do the procedure. 9.  You must have finished any antibiotic prescribed for recent infections. If required, please take pre-procedure antibiotic or other pre-procedure medications as instructed. 10. Bring inhalers and pain medications with you to your procedure. 11. Bring your MRI/CT films if they were done outside of the [facility]. 12. If you should develop a cold, sore throat, cough, fever or other new indication of illness or infection, or are started on antibiotics within 2 weeks of the scheduled procedure, please notify the [facility] office as early as possible at [phone]. If calling after 4:30pm the day prior to your scheduled procedure please contact [phone] and Leave a Voice Message. seizure   History of Migraines with Syncope   Anxiety/Doression   History of Drug Seeking per chart review   -Patient presented with syncope and reports seizure like activity. Seizure like activity was witnessed by neurology. Seizure like activity was not rhythmic and ceased with distraction.  -Recently been seen by neurology and ophthalmology OP to work up uncontrolled migraines. -MRI and EEG were both WNL   -Appreciate Neurology input - No need for keppra, recommended follow up with primary neurologist and psychiatry   -Discussed follow up with psychiatry with patient, she seemed agreeable. -Continue tizanidine, cymbalta and gabapentin as PTA   Leukocytosis with Recent Diagnosis of Occipital Neurology Leukocytosis associated with recent prednisone with taper. Recommended to continue to follow up with ophthalmologist   History of Lupus   Hypertension   Morbid Obesity      ECHO 8/2021  Interpretation Summary    · LV: Estimated LVEF is 55 - 60%. Normal cavity size, wall thickness and systolic function (ejection fraction normal). Age-appropriate left ventricular diastolic function. Comparison Study Information    Prior Study    There is a prior study available for comparison. Prior study date: 7/28/2020. Echo Findings    Left Ventricle Normal cavity size, wall thickness and systolic function (ejection fraction normal). The estimated EF is 55 - 60%. There is age-appropriate left ventricular diastolic function. Left Atrium Normal cavity size. Right Ventricle Normal cavity size and global systolic function. Right Atrium Normal cavity size. Aortic Valve Normal valve structure, no stenosis and no regurgitation. Mitral Valve Normal valve structure and no stenosis. Trace regurgitation. Tricuspid Valve Normal valve structure and no stenosis. Trace regurgitation. Pulmonic Valve Normal valve structure, no stenosis and no regurgitation. Aorta Normal aortic root, ascending aortic, and aortic arch. Pulmonary Artery Pulmonary hypertension not suggested by Doppler findings. IVC/Hepatic Veins Inferior vena cava not well visualized. Pericardium Insignificant pericardial effusion or fat. Event monitor 8/2020  Results   Arrhthymias:normal     Symptom Correlation:   Cp/lightheadedness/heart racing do not correlate with any arrhythmias     Comments:   Sinus rhythm with symptoms that do not correlate with any arrhythmias        Stress ECHO 7/2020  Interpretation Summary      · Baseline ECG: Normal sinus rhythm. · Negative stress test.  · Normal stress echocardiogram. Low risk study. · Low risk [facility] treadmill score.      2/2020 ECHO  Echo Findings      Left Ventricle  Normal cavity size and systolic function (ejection fraction normal). Mild concentric hypertrophy .  False tendon is noted. The estimated ejection fraction is 60 - 65%. LV wall motion is noted to be no regional wall motion abnormality. There is mild (grade 1) left ventricular diastolic dysfunction. Left Atrium  Normal cavity size. Right Ventricle  Normal cavity size and global systolic function. Right Atrium  Normal cavity size. Aortic Valve  Aortic valve not well visualized. No stenosis and no regurgitation. Mitral Valve  Normal valve structure, no stenosis and no regurgitation. Tricuspid Valve  Tricuspid valve not well visualized. No stenosis and no regurgitation. There is no evidence of pulmonary hypertension. Pulmonic Valve  Pulmonic valve not well visualized. No stenosis and no regurgitation. Aorta  Normal aortic root and ascending aorta.     Pericardium  No evidence of pericardial effusion.           PCP Provider  Asa Fernandez MD  Past Medical History:   Diagnosis Date    Abnormal CT of the abdomen 11/8/2012    Asthma     Autoimmune disease (HCC)     lupus    C. difficile diarrhea     Cervical prolapse     Dehydration     Mariah-Danlos syndrome     Gastrointestinal disorder     gerd, twisted colon, IBS  GERD (gastroesophageal reflux disease)     Headache(784.0)     Lupus (HCC)     Morbid obesity (HCC)     Nausea & vomiting     Neurological disorder     cluster headaches    Occipital neuralgia     other 2006, 2009    ovarian cyst removal    Other ill-defined conditions(799.89)     Worsening headaches       Past Surgical History:   Procedure Laterality Date    COLONOSCOPY  9/21/2010         HX CHOLECYSTECTOMY      HX CYST INCISION AND DRAINAGE Right 02/27/2020    HX GYN  1/2009    right salpingo oopherectomy    HX GYN  2006    right ovarian tumor removed    HX HEENT      left wisdom teeth removal    HX HEENT      top right wisdom tooth removed    HX HERNIA REPAIR  4/2012    HX HERNIA REPAIR  2/28/13    Laparoscopic recurrent incisional hernia repair    HX HYSTERECTOMY      2016    HX UROLOGICAL      Kidney Stone Removal    ID EGD TRANSORAL BIOPSY SINGLE/MULTIPLE  9/22/2010          Allergies   Allergen Reactions    Latex Itching     burning    Compazine [Prochlorperazine] Anxiety     High heart rate  Pt can take promethazine with no problems    Sulfa (Sulfonamide Antibiotics) Unknown (comments)    Topamax [Topiramate] Unknown (comments)    Adhesive Rash     Allergic to Dermabond    Clindamycin Diarrhea     Pt states caused her C-Diff    Mushroom Other (comments)    Mushroom Combination No.1 Unknown (comments)    Toradol [Ketorolac Tromethamine] Nausea and Vomiting and Other (comments)     PO & IV causes GI bleed  Tolerated during Feb 2020 stay    Valproic Acid Other (comments)     Elevated heart rate and vomiting        Family History   Problem Relation Age of Onset    Colon Cancer Maternal Grandfather       Current Outpatient Medications   Medication Sig    albuterol (PROVENTIL HFA, VENTOLIN HFA, PROAIR HFA) 90 mcg/actuation inhaler Take 1 Puff by inhalation every six (6) hours as needed for Wheezing.     predniSONE (DELTASONE) 10 mg tablet Take 60 mg by mouth daily (with breakfast). Day 1 and 2: 60mg; Day 3: 50mg; Day 4:40mg; Day 5: 30 mg; Day 6: 20mg; Day 7: 10mg    gabapentin (NEURONTIN) 100 mg capsule TAKE 2 CAPSULES BY MOUTH 3 TIMES A DAY    propranolol LA (INDERAL LA) 80 mg SR capsule 80 mg daily.  codeine-butalbital-acetaminophen-caffeine (FIORICET WITH CODEINE) -15-30 mg capsule Take 1 Capsule by mouth every six (6) hours as needed for Headache.  furosemide (LASIX) 20 mg tablet Take 20 mg by mouth as needed (swelling).  tiZANidine (ZANAFLEX) 4 mg tablet TAKE 1 TABLET BY MOUTH 3 TIMES A DAY AS NEEDED FOR PAIN    mirtazapine (REMERON) 30 mg tablet TAKE 1 TABLET BY MOUTH AT BEDTIME    ALPRAZolam (XANAX) 1 mg tablet One bid prn anxiety    ondansetron (Zofran ODT) 4 mg disintegrating tablet 1 Tablet by SubLINGual route every eight (8) hours as needed for Nausea or Vomiting.  DULoxetine (CYMBALTA) 60 mg capsule TAKE 2 CAPSULESBY MOUTH EVERY DAY    naloxone (NARCAN) 4 mg/actuation nasal spray Use 1 spray intranasally, then discard. Repeat with new spray every 2 min as needed for opioid overdose symptoms, alternating nostrils. (Patient not taking: Reported on 8/29/2021)    pantoprazole (PROTONIX) 40 mg tablet take 1 tablet by mouth at bedtime    rizatriptan (MAXALT) 10 mg tablet Take 10 mg by mouth once as needed for Migraine. May repeat in 2 hours if needed     belimumab (Benlysta) 400 mg solr injection by IntraVENous route. No current facility-administered medications for this visit. There were no vitals filed for this visit.   Social History     Socioeconomic History    Marital status: LEGALLY      Spouse name: Not on file    Number of children: Not on file    Years of education: Not on file    Highest education level: Not on file   Occupational History    Not on file   Tobacco Use    Smoking status: Never Smoker    Smokeless tobacco: Never Used   Vaping Use    Vaping Use: Never used   Substance and Sexual Activity    Alcohol use: No    Drug use: No    Sexual activity: Not Currently     Partners: Male     Birth control/protection: Abstinence   Other Topics Concern    Not on file   Social History Narrative    Not on file     Social Determinants of Health     Financial Resource Strain:     Difficulty of Paying Living Expenses:    Food Insecurity:     Worried About Running Out of Food in the Last Year:     [address] in the Last Year:    Transportation Needs:     Lack of Transportation (Medical):  Lack of Transportation (Non-Medical):    Physical Activity:     Days of Exercise per Week:     Minutes of Exercise per Session:    Stress:     Feeling of Stress :    Social Connections:     Frequency of Communication with Friends and Family:     Frequency of Social Gatherings with Friends and Family:     Attends Voodoo Services:     Active Member of Clubs or Organizations:     Attends Club or Organization Meetings:     Marital Status:    Intimate Partner Violence:     Fear of Current or Ex-Partner:     Emotionally Abused:     Physically Abused:     Sexually Abused:        I have reviewed the nurses notes, vitals, problem list, allergy list, medical history, family, social history and medications. Review of Symptoms:  11 systems reviewed, negative other than as stated in the HPI      Physical Exam:      General: Well developed, in no acute distress, cooperative and alert  HEENT: No carotid bruits, no JVD, trach is midline. Neck Supple, PERRL, EOM intact. Heart:  Normal S1/S2 negative S3 or S4. Regular, no murmur, gallop or rub. Respiratory: Clear bilaterally x 4, no wheezing or rales  Abdomen:   Soft, non-tender, no masses, bowel sounds are active. Extremities: 1+ Pitting ankle edema, normal cap refill, no cyanosis, atraumatic. Neuro: A&Ox3, speech clear, gait stable. Skin: Skin color is normal. No rashes or lesions.  Non diaphoretic  Vascular: 2+ pulses symmetric in all extremities    Cardiographics    EC2021 ER EKG        Cardiology Labs:  No results found for: CHOL, CHOLX, CHLST, CHOLV, [id number], HDL, HDLP, LDL, LDLC, DLDLP, TGLX, TRIGL, TRIGP, [facility], [facility]    Lab Results   Component Value Date/Time    Sodium 141 2021 04:35 AM    Potassium 3.9 2021 04:35 AM    Chloride 108 2021 04:35 AM    CO2 29 2021 04:35 AM    Anion gap 4 (L) 2021 04:35 AM    Glucose 125 (H) 2021 04:35 AM    BUN 19 2021 04:35 AM    Creatinine 0.90 2021 04:35 AM    BUN/Creatinine ratio 21 (H) 2021 04:35 AM    GFR est AA >60 2021 04:35 AM    GFR est non-AA >60 2021 04:35 AM    Calcium 8.8 2021 04:35 AM    Bilirubin, total 0.2 2021 04:35 AM    Alk. phosphatase 98 2021 04:35 AM    Protein, total 6.8 2021 04:35 AM    Albumin 3.3 (L) 2021 04:35 AM    Globulin 3.5 2021 04:35 AM    A-G Ratio 0.9 (L) 2021 04:35 AM    ALT (SGPT) 17 2021 04:35 AM           Assessment:     Assessment:     Diagnoses and all orders for this visit:    1. Essential hypertension        ICD-10-CM ICD-9-CM    1. Essential hypertension  I10 401.9      No orders of the defined types were placed in this encounter. Plan:   1. Hypertension: Controlled 119/80 heart rate stable continue propanolol 80 mg daily  2. Lower extremity edema: Negative proteinuria on hospital UA, normal renal function, abdominal CT  no suspicion for vena cava compression/obstruction, normal ejection fraction. Will check proBNP to rule out diastolic heart failure. Venous duplex and reflux study. If negative for DVT which is low suspicion and normal proBNP will then recommend compression stockings if no resolution will then have her follow-up with vascular. Follow-up in 1 year. Tristian Lozano NP      Please note that this dictation was completed with [employer], the computer voice recognition software.   Quite often unanticipated grammatical, syntax, homophones, and other interpretive errors are inadvertently transcribed by the computer software. Please disregard these errors. Please excuse any errors that have escaped final proofreading. Thank you.

## 2022-01-20 NOTE — PROGRESS NOTES
Subjective:      Patient ID: Chip Felton is a 61 y.o. male. Chief Complaint   Patient presents with    Follow-up     post TFE    Pain     right lower back and down leg. HPI   Post injection follow up    Pain Assessment  Location of Pain: Back  Location Modifiers: Right,Posterior,Inferior (radiates down right hip to leg. )  Severity of Pain: 8  Quality of Pain: Aching,Other (Comment) (burn)  Duration of Pain: Persistent  Frequency of Pain: Constant  Aggravating Factors:  Other (Comment),Standing (sitting, weather)  Limiting Behavior: Yes  Relieving Factors: Rest    No Known Allergies    Outpatient Medications Marked as Taking for the 1/20/22 encounter (Office Visit) with SUDARSHAN Aguilar CNP   Medication Sig Dispense Refill    ibuprofen (ADVIL;MOTRIN) 800 MG tablet Take 1 tablet by mouth every 6 hours as needed for Pain 120 tablet 3    metoprolol succinate (TOPROL XL) 25 MG extended release tablet Take 1 tablet by mouth 2 times daily      ASPIRIN 81 PO Take 1 tablet by mouth daily      atorvastatin (LIPITOR) 80 MG tablet Take 80 mg by mouth daily      furosemide (LASIX) 20 MG tablet Take 20 mg by mouth 2 times daily      insulin aspart protamine-insulin aspart (NOVOLOG 70/30 FLEXPEN RELION) (70-30) 100 UNIT/ML injection Inject 8 Units into the skin 3 times daily       insulin glargine (LANTUS) 100 UNIT/ML injection vial Inject 40 Units into the skin daily       isosorbide mononitrate (IMDUR) 60 MG extended release tablet Take 60 mg by mouth daily      magnesium oxide (MAG-OX) 400 MG tablet Take 400 mg by mouth daily      pantoprazole sodium (PROTONIX) 40 MG PACK packet Take 40 mg by mouth every morning (before breakfast)         Past Medical History:   Diagnosis Date    Anxiety due to invasive procedure     CAD (coronary artery disease)     [facility]    [facility]estive [facility] failure) [facility]     DDD (degenerative disc disease), lumbar     DM (diabetes mellitus), type 1 [facility] hyperos[facility]olarity [facility]     HTN (hypertension)     Hyperlipidemia     Ischemic cardiomyopathy     Lumbar radiculopathy     Sciatica        Past Surgical History:   Procedure Laterality Date    CARDIAC SURGERY      S/P Triple Bypass     CARPAL TUNNEL RELEASE      CORONARY ANGIOPLASTY WITH STENT PLACEMENT      PAIN MANAGEMENT PROCEDURE Right 1/7/2022    Right L4 L5 TRANSFORAMINAL performed by Nancy Evans MD at [facility] OR       Family History   Problem Relation Age of Onset    Heart Disease Mother     Diabetes Father        Social History     Socioeconomic History    Marital status: Single     Spouse name: None    Number of children: None    Years of education: None    Highest education level: None   Occupational History    None   Tobacco Use    Smoking status: Current Every Day Smoker     Packs/day: 1.00     Types: Cigarettes    Smokeless tobacco: Never Used   Substance and Sexual Activity    Alcohol use: Not Currently     Comment: Montly or Less    Drug use: Never    Sexual activity: None   Other Topics Concern    None   Social History Narrative    None     Social Determinants of Health     Financial Resource Strain:     Difficulty of Paying Living Expenses: Not on file   Food Insecurity:     Worried About Running Out of Food in the Last Year: Not on file    Mike of Food in the Last Year: Not on file   Transportation Needs:     Lack of Transportation (Medical): Not on file    Lack of Transportation (Non-Medical):  Not on file   Physical Activity:     Days of Exercise per Week: Not on file    Minutes of Exercise per Session: Not on file   Stress:     Feeling of Stress : Not on file   Social Connections:     Frequency of Communication with Friends and Family: Not on file    Frequency of Social Gatherings with Friends and Family: Not on file    Attends Zoroastrian Services: Not on file    Active Member of Clubs or Organizations: Not on file    Attends Club or Organization Meetings: Not on file   Jeanne Daley Marital Status: Not on file   Intimate Partner Violence:     Fear of Current or Ex-Partner: Not on file    Emotionally Abused: Not on file    Physically Abused: Not on file    Sexually Abused: Not on file   Housing Stability:     Unable to Pay for Housing in the Last Year: Not on file    Number of [place] in the Last Year: Not on file    Unstable Housing in the Last Year: Not on file     Review of Systems   Constitutional: Negative for activity change. Respiratory: Negative. Cardiovascular: Negative. Gastrointestinal: Negative. Genitourinary: Negative. Musculoskeletal: Positive for arthralgias, back pain and myalgias. Skin: Negative. Neurological: Positive for weakness and numbness. Hematological: Does not bruise/bleed easily. Psychiatric/Behavioral: Positive for sleep disturbance. Objective:   Physical Exam  Vitals reviewed. Constitutional:       General: He is awake. He is not in acute distress. Appearance: Normal appearance. He is well-developed. He is not ill-appearing, toxic-appearing or diaphoretic. HENT:      Head: Normocephalic and atraumatic. Eyes:      General: Lids are normal.   Pulmonary:      Effort: Pulmonary effort is normal. No tachypnea, bradypnea, accessory muscle usage, prolonged expiration, respiratory distress or retractions. Abdominal:      General: Abdomen is flat. Palpations: Abdomen is soft. Musculoskeletal:      Lumbar back: Positive right straight leg raise test.   Skin:     General: Skin is warm and dry. Capillary Refill: Capillary refill takes less than 2 seconds. Coloration: Skin is not ashen, jaundiced or pale. Findings: No rash. Nails: There is no clubbing. Neurological:      Mental Status: He is alert and oriented to person, place, and time. Cranial Nerves: No cranial nerve deficit.    Psychiatric:         Attention and Perception: Attention normal.         Mood and Affect: Mood normal. Speech: Speech normal.         Behavior: Behavior is cooperative. Cognition and Memory: Cognition normal.         Judgment: Judgment normal.         Assessment / Plan:      Diagnosis Orders   1. DDD (degenerative disc disease), lumbar     2. Lumbar radiculitis     3. Lumbar spondylolysis       Schedule repeat right L4, L5 transforaminal    Informed consent has not been obtained for procedure. [place]  on blood thinners. Medications to hold have been reviewed with patient. Blood thinners must be held with permission from your cardiologist or primary care physician. A letter is  required to besent to PCP/Cardiologist regarding holding medications for procedure to decrease bleeding risk.

## 2022-01-28 ENCOUNTER — APPOINTMENT (OUTPATIENT)
Dept: GENERAL RADIOLOGY | Age: 57
End: 2022-01-28
Attending: PHYSICAL MEDICINE & REHABILITATION
Payer: MEDICARE

## 2022-01-28 ENCOUNTER — HOSPITAL ENCOUNTER (OUTPATIENT)
Age: 57
Setting detail: OUTPATIENT SURGERY
Discharge: HOME | End: 2022-01-28
Attending: PHYSICAL MEDICINE & REHABILITATION | Admitting: PHYSICAL MEDICINE & REHABILITATION
Payer: MEDICARE

## 2022-01-28 VITALS
HEIGHT: 68 IN | HEART RATE: 76 BPM | TEMPERATURE: 98.6 F | RESPIRATION RATE: 16 BRPM | BODY MASS INDEX: 27.28 KG/M2 | OXYGEN SATURATION: 99 % | DIASTOLIC BLOOD PRESSURE: 86 MMHG | WEIGHT: 180 LBS | SYSTOLIC BLOOD PRESSURE: 138 MMHG

## 2022-01-28 PROCEDURE — 3600000056 HC PAIN LEVEL 4 BASE: Performed by: PHYSICAL MEDICINE & REHABILITATION

## 2022-01-28 PROCEDURE — 2709999900 HC NON-CHARGEABLE SUPPLY: Performed by: PHYSICAL MEDICINE & REHABILITATION

## 2022-01-28 PROCEDURE — 3209999900 FLUORO FOR SURGICAL PROCEDURES

## 2022-01-28 PROCEDURE — 2580000003 HC RX 258: Performed by: PHYSICAL MEDICINE & REHABILITATION

## 2022-01-28 PROCEDURE — 64484 NJX AA&/STRD TFRM EPI L/S EA: CPT | Performed by: PHYSICAL MEDICINE & REHABILITATION

## 2022-01-28 PROCEDURE — A4216 STERILE WATER/SALINE, 10 ML: HCPCS | Performed by: PHYSICAL MEDICINE & REHABILITATION

## 2022-01-28 PROCEDURE — 2500000003 HC RX 250 WO HCPCS: Performed by: PHYSICAL MEDICINE & REHABILITATION

## 2022-01-28 PROCEDURE — 6360000004 HC RX CONTRAST MEDICATION: Performed by: PHYSICAL MEDICINE & REHABILITATION

## 2022-01-28 PROCEDURE — 7100000010 HC PHASE II RECOVERY - FIRST 15 MIN: Performed by: PHYSICAL MEDICINE & REHABILITATION

## 2022-01-28 PROCEDURE — 6360000002 HC RX W HCPCS: Performed by: PHYSICAL MEDICINE & REHABILITATION

## 2022-01-28 PROCEDURE — 64483 NJX AA&/STRD TFRM EPI L/S 1: CPT | Performed by: PHYSICAL MEDICINE & REHABILITATION

## 2022-01-28 RX ORDER — BUPIVACAINE HYDROCHLORIDE 5 MG/ML
INJECTION, SOLUTION EPIDURAL; INTRACAUDAL PRN
Status: DISCONTINUED | OUTPATIENT
Start: 2022-01-28 | End: 2022-01-28 | Stop reason: ALTCHOICE

## 2022-01-28 RX ORDER — SODIUM CHLORIDE 9 MG/ML
INJECTION INTRAVENOUS PRN
Status: DISCONTINUED | OUTPATIENT
Start: 2022-01-28 | End: 2022-01-28 | Stop reason: ALTCHOICE

## 2022-01-28 RX ORDER — DEXAMETHASONE SODIUM PHOSPHATE 10 MG/ML
INJECTION INTRAMUSCULAR; INTRAVENOUS PRN
Status: DISCONTINUED | OUTPATIENT
Start: 2022-01-28 | End: 2022-01-28 | Stop reason: ALTCHOICE

## 2022-01-28 ASSESSMENT — PAIN - FUNCTIONAL ASSESSMENT: PAIN_FUNCTIONAL_ASSESSMENT: 0-10

## 2022-01-28 ASSESSMENT — ENCOUNTER SYMPTOMS
GASTROINTESTINAL NEGATIVE: 1
RESPIRATORY NEGATIVE: 1
BACK PAIN: 1

## 2022-01-28 ASSESSMENT — PAIN SCALES - GENERAL: PAINLEVEL_OUTOF10: 0

## 2022-01-28 ASSESSMENT — PAIN DESCRIPTION - DESCRIPTORS: DESCRIPTORS: ACHING

## 2022-01-28 NOTE — H&P
Subjective:      Patient ID: Abi Hmam is a 61 y.o. male. No chief complaint on file.     HPI   Post injection follow up         No Known Allergies    Outpatient Medications Marked as Taking for the 1/28/22 encounter [facility] Encounter)   Medication Sig Dispense Refill    ibuprofen (ADVIL;MOTRIN) 800 MG tablet Take 1 tablet by mouth every 6 hours as needed for Pain 120 tablet 3    atorvastatin (LIPITOR) 80 MG tablet Take 80 mg by mouth daily      furosemide (LASIX) 20 MG tablet Take 20 mg by mouth 2 times daily      insulin aspart protamine-insulin aspart (NOVOLOG 70/30 FLEXPEN RELION) (70-30) 100 UNIT/ML injection Inject 8 Units into the skin 3 times daily       insulin glargine (LANTUS) 100 UNIT/ML injection vial Inject 40 Units into the skin daily       isosorbide mononitrate (IMDUR) 60 MG extended release tablet Take 60 mg by mouth daily      magnesium oxide (MAG-OX) 400 MG tablet Take 400 mg by mouth daily      pantoprazole sodium (PROTONIX) 40 MG PACK packet Take 40 mg by mouth every morning (before breakfast)         Past Medical History:   Diagnosis Date    Anxiety due to invasive procedure     [facility]onary artery disease)     [facility]    CHF [facility]congest[facility] heart failure) ([facility] [address]     DDD (degenerative disc disease), lumbar     DM (diabetes mellitus), type 1 with hyperosmolarity (HCC)     HTN (hypertension)     Hyperlipidemia     Ischemic cardiomyopathy     Lumbar radiculopathy     Sciatica        Past Surgical History:   Procedure Laterality Date    CARDIAC SURGERY      S/P Triple Bypass     CARPAL TUNNEL RELEASE      CORONARY ANGIOPLASTY WITH STENT PLACEMENT      PAIN MANAGEMENT PROCEDURE Right 1/7/2022    Right L4 L5 TRANSFORAMINAL performed by Jesusita Parikh MD at [facility]       Family History   Problem Relation Age of Onset    Heart Disease Mother     Diabetes Father        Social History     Socioeconomic History    Marital status: Single     Spouse name: None    Number of children: None    Years of education: None    Highest education level: None   Occupational History    None   Tobacco Use    Smoking status: Current Every Day Smoker     Packs/day: 1.00     Types: Cigarettes    Smokeless tobacco: Never Used   Substance and Sexual Activity    Alcohol use: Not Currently     Comment: Montly or Less    Drug use: Never    Sexual activity: None   Other Topics Concern    None   Social History Narrative    None     Social Determinants of Health     Financial Resource Strain:     Difficulty of Paying Living Expenses: Not on file   Food Insecurity:     Worried About Running Out of Food in the Last Year: Not on file    Mike of Food in the Last Year: Not on file   Transportation Needs:     Lack of Transportation (Medical): Not on file    Lack of Transportation (Non-Medical): Not on file   Physical Activity:     Days of Exercise per Week: Not on file    Minutes of Exercise per Session: Not on file   Stress:     Feeling of Stress : Not on file   Social Connections:     Frequency of Communication with Friends and Family: Not on file    Frequency of Social Gatherings with Friends and Family: Not on file    Attends Protestant Services: Not on file    Active Member of [address] [place] [address] or Organizations: Not on file    Attends Club or Organization Meetings: Not on file    Marital Status: Not on file   Intimate Partner Violence:     Fear of Current or Ex-Partner: Not on file    Emotionally Abused: Not on file    Physically Abused: Not on file    Sexually Abused: Not on file   Housing Stability:     Unable to Pay for Housing in the Last Year: Not on file    Number of [place] in the Last Year: Not on file    Unstable Housing in the Last Year: Not on file     Review of Systems   Constitutional: Negative for activity change. Respiratory: Negative. Cardiovascular: Negative. Gastrointestinal: Negative. Genitourinary: Negative.     Musculoskeletal: Positive for arthralgias, back pain and myalgias. Skin: Negative. Neurological: Positive for weakness and numbness. Hematological: Does not bruise/bleed easily. Psychiatric/Behavioral: Positive for sleep disturbance. Objective:   Physical Exam  Vitals reviewed. Constitutional:       General: He is awake. He is not in acute distress. Appearance: Normal appearance. He is well-developed. He is not ill-appearing, toxic-appearing or diaphoretic. HENT:      Head: Normocephalic and atraumatic. Eyes:      General: Lids are normal.   Pulmonary:      Effort: Pulmonary effort is normal. No tachypnea, bradypnea, accessory muscle usage, prolonged expiration, respiratory distress or retractions. Abdominal:      General: Abdomen is flat. Palpations: Abdomen is soft. Musculoskeletal:      Lumbar back: Positive right straight leg raise test.   Skin:     General: Skin is warm and dry. Capillary Refill: Capillary refill takes less than 2 seconds. Coloration: Skin is not ashen, jaundiced or pale. Findings: No rash. Nails: There is no clubbing. Neurological:      Mental Status: He is alert and oriented to person, place, and time. Cranial Nerves: No cranial nerve deficit. Psychiatric:         Attention and Perception: Attention normal.         Mood and Affect: Mood normal.         Speech: Speech normal.         Behavior: Behavior is cooperative. Cognition and Memory: Cognition normal.         Judgment: Judgment normal.         Assessment / Plan:      Diagnosis Orders   1. DDD (degenerative disc disease), lumbar     2. Lumbar radiculitis     3. Lumbar spondylolysis       Schedule repeat right L4, L5 transforaminal    Informed consent has not been obtained for procedure. Imelda Meyer  on blood thinners. Medications to hold have been reviewed with patient. Blood thinners must be held with permission from your cardiologist or primary care physician.  A letter is  required to besent to PCP/Cardiologist regarding holding medications for procedure to decrease bleeding risk.

## 2022-01-28 NOTE — OP NOTE
Rubio Albright TRANSFORAMINAL EPIDURAL STEROID INJECTION    1/28/22  The patient was counseled at length about the risks of evelio Covid-19 during their perioperative period and any recovery window from their procedure. The patient was made aware that evelio Covid-19  may worsen their prognosis for recovering from their procedure  and lend to a higher morbidity and/or mortality risk. All material risks, benefits, and reasonable alternatives including postponing the procedure were discussed. The patient does wish to proceed with the procedure at this time. Surgeon: Oral Green MD    Pre-operative Diagnosis:   Hospital Problems           Last Modified POA    * (Principal) Lumbar radiculitis 1/28/2022 Yes    Lumbar spondylolysis 1/28/2022 Yes          Post-operative Diagnosis: Same    Assistants: none    This is a 61 y.o. male patient with pain in the Back, right leg. Previous treatment and examination findings are noted in the H&P. Right L4,L5 transforaminal epidural injection has been requested for diagnostic and therapeutic reasons. Conservative treatment was ineffective i.e.: ice, NSAIDS, rest, narcotic medication, chiropractic care, physical therapy and message therapy. Patient is unable to perform the following ADL's: personal cares and ambulating     Pain Assessment: 0-10  Pain Level: 7     Pain Orientation: Right  Pain Location: Back  Pain Descriptors: Aching    Last Plain films: 2020      EXAMINATION:  1. R L4,5 transforaminal radiculogram/epidurogram.   2. Rl4,5 transforaminal epidural anesthetic injection. 3. R L4,5 transforaminal epidural steroid injection. CONSENT: Written consent was obtained from the patient on preprinted consent form after explaining the procedure, indications, potential complications and outcomes. Alternative treatments were also discussed. DISCUSSION: The patient was sterilely prepped and draped in the usual fashion in the prone position.  Time out was verified for correct patient, side, level and procedure. SEDATION:   No conscious sedation was performed during the procedure. The patient remained awake and conversed throughout the procedure. The patient underwent pulse oximetry and blood pressure monitoring independently by a trained observer, as well as by a physician. PROCEDURE:  Under image-intensifier control, a 22 gauge needle x 5 inch spinal needle was guided successfully into the epidural space employing a posterior lateral/oblique approach. Needle aspiration was negative for heme or CSF. Instillation of  .5 mL of Omnipaque 240 contrast medium opacified the spinal nerve and demonstrated contiguous flow into the  RL 4epidural space. No vascular spread was noted. Digital subtraction was not employed to evaluate for vascular spread. The patient was monitored for any untoward reaction to contrast medium before proceeding with procedure #2. The patient did not report pain reproduction in a concordant distribution. Following needle position verification, a test dose of .5 mL of sterile lidocaine 0.5% was administered and patient monitored for any adverse effects. Then, 1 mL of   was instilled into the epidural space and the patient's response was again monitored. Finally, Dexamethasone (Decadron 10 mg/mL) 1 ml of 0.5% bupivacaine was then instilled. The patient's response was again monitored. The spinal needle was removed. Instillation of  .5 mL of Omnipaque 240 contrast medium opacified the spinal nerve and demonstrated contiguous flow into the  RL 5epidural space. No vascular spread was noted. Digital subtraction was not employed to evaluate for vascular spread. The patient was monitored for any untoward reaction to contrast medium before proceeding with procedure #2. The patient did not report pain reproduction in a concordant distribution.     Following needle position verification, a test dose of .5 mL of sterile lidocaine 0.5% was administered and patient monitored for any adverse effects. Then, 1 mL of   was instilled into the epidural space and the patient's response was again monitored. Finally, Dexamethasone (Decadron 10 mg/mL) 1 ml of 0.5% bupivacaine was then instilled. The patient's response was again monitored. The spinal needle was removed    The patient tolerated the procedure well and without complications and was noted to be in stable condition prior to discharge from the procedure center with discharge instructions. EBL: no blood loss    SPECIMEN: none    IMPRESSIONS:  1. R L4,5 transforaminal epidurogram, epidural anesthesia and epidural steroid injection procedures accomplished without incident. RECOMMENDATIONS:  1. Complete and return Post-Procedure Pain and Activity Diary.   2. Contact the [address] for symptom exacerbation, fever or unusual symptoms. 3. Post-procedure care according to verbal and written discharge instructions    POST-PROCEDURE EPIDUROGRAPHY INTERPRETATION:    EXAMINATION: AP, lateral, and oblique views    FLUORO TIME: 13 seconds    DISCUSSION: Spot views of the spine reveal normal alignment and segmentation. Spinal needle is positioned at the RL 4,5 neuroforamin. Contrast spreads and outlines the RL4,5 nerve/ neuroforamin and epidural space. The epidurogram reveals excellent contrast flow. Visualized spine reveals See radiology report. Soft tissues reveal no abnormalities. IMPRESSION: RL 4,5 transforaminal epidurogram/epineurogram reveals satisfactory needle position and contrast spread.      Electronically signed by Madelin Lopes MD on 1/28/2022 at 10:01 AM

## 2022-01-28 NOTE — INTERVAL H&P NOTE
I have interviewed and examined the patient and reviewed the recent History and Physical.  There have been no changes to the recent H&P documentation. The surgical consent form has been signed. Last anticoagulant medication use was:5 days    Premedication taken for contrast allergy? No    Valium taken for oral sedation? No    Outpatient Medications Marked as Taking for the 1/28/22 encounter [facility] Encounter)   Medication Sig Dispense Refill    ibuprofen (ADVIL;MOTRIN) 800 MG tablet Take 1 tablet by mouth every 6 hours as needed for Pain 120 tablet 3    atorvastatin (LIPITOR) 80 MG tablet Take 80 mg by mouth daily      furosemide (LASIX) 20 MG tablet Take 20 mg by mouth 2 times daily      insulin aspart protamine-insulin aspart (NOVOLOG 70/30 FLEXPEN RELION) (70-30) 100 UNIT/ML injection Inject 8 Units into the skin 3 times daily       insulin glargine (LANTUS) 100 UNIT/ML injection vial Inject 40 Units into the skin daily       isosorbide mononitrate (IMDUR) 60 MG extended release tablet Take 60 mg by mouth daily      magnesium oxide (MAG-OX) 400 MG tablet Take 400 mg by mouth daily      pantoprazole sodium (PROTONIX) 40 MG PACK packet Take 40 mg by mouth every morning (before breakfast)         The patient understands the planned operation and its associated risks and benefits and agrees to proceed.         Electronically signed by Wilfredo Robbins MD on 1/28/2022 at 9:54 AM

## 2022-02-03 ENCOUNTER — VIRTUAL VISIT (OUTPATIENT)
Dept: PAIN MANAGEMENT | Age: 57
End: 2022-02-03
Payer: MEDICARE

## 2022-02-03 DIAGNOSIS — M54.06 PANNICULITIS INVOLVING LUMBAR REGION: Primary | ICD-10-CM

## 2022-02-03 DIAGNOSIS — M51.36 DDD (DEGENERATIVE DISC DISEASE), LUMBAR: ICD-10-CM

## 2022-02-03 DIAGNOSIS — M47.816 LUMBAR FACET JOINT SYNDROME: ICD-10-CM

## 2022-02-03 PROCEDURE — 99211 OFF/OP EST MAY X REQ PHY/QHP: CPT

## 2022-02-03 PROCEDURE — G8427 DOCREV CUR MEDS BY ELIG CLIN: HCPCS | Performed by: NURSE PRACTITIONER

## 2022-02-03 PROCEDURE — 99214 OFFICE O/P EST MOD 30 MIN: CPT | Performed by: NURSE PRACTITIONER

## 2022-02-03 PROCEDURE — 3017F COLORECTAL CA SCREEN DOC REV: CPT | Performed by: NURSE PRACTITIONER

## 2022-02-03 ASSESSMENT — ENCOUNTER SYMPTOMS
GASTROINTESTINAL NEGATIVE: 1
RESPIRATORY NEGATIVE: 1
BACK PAIN: 1

## 2022-02-03 NOTE — PROGRESS NOTES
Subjective:      Patient ID: Shubham Rene is a 61 y.o. male. No chief complaint on file. HPI   Post injection follow up, right L4, L5 x2 with relief. Continues to complain back pain, worse in cold. No Known Allergies    No outpatient medications have been marked as taking for the 2/3/22 encounter (Appointment) with SUDARSHAN Ly CNP.        Past Medical History:   Diagnosis Date    Anxiety due to invasive procedure     [facility]ary artery disease)     [facility]    CHF (congestive heart failure) ([facility])     DDD (degenerative disc disease), lumbar     DM (diabetes mellitus), type 1 with hyperosmolarity ([facility])     HTN (hypertension)     Hyperlipidemia     Ischemic cardiomyopathy     Lumbar radiculopathy     Sciatica        Past Surgical History:   Procedure Laterality Date    CARDIAC SURGERY      S/P Triple Bypass     CARPAL TUNNEL RELEASE      CORONARY ANGIOPLASTY WITH STENT PLACEMENT      PAIN MANAGEMENT PROCEDURE Right 1/7/2022    Right L4 L5 TRANSFORAMINAL performed by Asad Meneses MD at [facility] Right 1/28/2022    Right L4 L5  TRANSFORAMINAL performed by Asad Meneses MD at [address] [place] [address] OR       Family History   Problem Relation Age of Onset    Heart Disease Mother     Diabetes Father        Social History     Socioeconomic History    Marital status: Single     Spouse name: None    Number of children: None    Years of education: None    Highest education level: None   Occupational History    None   Tobacco Use    Smoking status: Current Every Day Smoker     Packs/day: 1.00     Types: Cigarettes    Smokeless tobacco: Never Used   Substance and Sexual Activity    Alcohol use: Not Currently     Comment: Montly or Less    Drug use: Never    Sexual activity: None   Other Topics Concern    None   Social History Narrative    None     Social Determinants of Health     Financial Resource Strain:     Difficulty of Paying Living Expenses: Not on file Food Insecurity:     Worried About Running Out of Food in the Last Year: Not on file    Mike of Food in the Last Year: Not on file   Transportation Needs:     Lack of Transportation (Medical): Not on file    Lack of Transportation (Non-Medical): Not on file   Physical Activity:     Days of Exercise per Week: Not on file    Minutes of Exercise per Session: Not on file   Stress:     Feeling of Stress : Not on file   Social Connections:     Frequency of Communication with Friends and Family: Not on file    Frequency of Social Gatherings with Friends and Family: Not on file    Attends Yazidi Services: Not on file    Active Member of [address] [place] [address] [employer] or Organizations: Not on file    Attends Club or Organization Meetings: Not on file    Marital Status: Not on file   Intimate Partner Violence:     Fear of Current or Ex-Partner: Not on file    Emotionally Abused: Not on file    Physically Abused: Not on file    Sexually Abused: Not on file   Housing Stability:     Unable to Pay for Housing in the Last Year: Not on file    Number of [place] in the Last Year: Not on file    Unstable Housing in the Last Year: Not on file     Review of Systems   Constitutional: Negative for activity change. Respiratory: Negative. Cardiovascular: Negative. Gastrointestinal: Negative. Genitourinary: Negative. Musculoskeletal: Positive for arthralgias, back pain and myalgias. Skin: Negative. Neurological: Positive for weakness and numbness. Hematological: Does not bruise/bleed easily. Psychiatric/Behavioral: Positive for sleep disturbance. Objective:   Physical Exam  Vitals reviewed. Constitutional:       General: He is awake. He is not in acute distress. Appearance: Normal appearance. He is well-developed. He is not ill-appearing, toxic-appearing or diaphoretic. HENT:      Head: Normocephalic and atraumatic.    Eyes:      General: Lids are normal.   Pulmonary:      Effort: Pulmonary effort is normal. No tachypnea, bradypnea, accessory muscle usage, prolonged expiration, respiratory distress or retractions. Abdominal:      General: Abdomen is flat. Palpations: Abdomen is soft. Musculoskeletal:      Lumbar back: Positive right straight leg raise test.   Skin:     General: Skin is warm and dry. Capillary Refill: Capillary refill takes less than 2 seconds. Coloration: Skin is not ashen, jaundiced or pale. Findings: No rash. Nails: There is no clubbing. Neurological:      Mental Status: He is alert and oriented to person, place, and time. Cranial Nerves: No cranial nerve deficit. Psychiatric:         Attention and Perception: Attention normal.         Mood and Affect: Mood normal.         Speech: Speech normal.         Behavior: Behavior is cooperative. Cognition and Memory: Cognition normal.         Judgment: Judgment normal.         Assessment / Plan:      Diagnosis Orders   1. Panniculitis involving lumbar region     2. Lumbar facet joint syndrome     3. DDD (degenerative disc disease), lumbar       Schedule right L4/L5 and L5/S1 diagnostic MBB    Informed consent has not been obtained for procedure. Papa Sutherland  on blood thinners. Medications to hold have been reviewed with patient. Blood thinners must be held with permission from your cardiologist or primary care physician. A letter is not  required to besent to PCP/Cardiologist regarding holding medications for procedure to decrease bleeding risk.

## 2022-02-03 NOTE — Clinical Note
Schedule right L4/L5 and L5/S1 diagnostic MBB, same sedation as he had with [facility] NEUROSURGERY POST OP NOTE:    S/P Lumbar 4/5 5/Sacral 1 Transforaminal lumbar interbody fusion with pedicle screw fixation  Pt seen and examined at bedside. Pt reports back pain incisional pain  denies radicular pain or parasthesia           T(C): 36.7 (12-09-19 @ 17:08), Max: 36.7 (12-09-19 @ 16:08)  HR: 89 (12-09-19 @ 17:38) (83 - 116)  BP: 121/74 (12-09-19 @ 17:38) (103/81 - 162/85)  RR: 10 (12-09-19 @ 17:38) (8 - 18)  SpO2: 97% (12-09-19 @ 17:38) (97% - 100%)      12-09-19 @ 07:01  -  12-09-19 @ 17:49  --------------------------------------------------------  IN: 180 mL / OUT: 165 mL / NET: 15 mL        acetaminophen   Tablet .. 650 milliGRAM(s) Oral every 6 hours PRN  acetaminophen  IVPB .. 1000 milliGRAM(s) IV Intermittent once  ALPRAZolam 1 milliGRAM(s) Oral daily PRN  ceFAZolin   IVPB 1000 milliGRAM(s) IV Intermittent every 8 hours  cyclobenzaprine 10 milliGRAM(s) Oral three times a day PRN  dextrose 5% + sodium chloride 0.45%. 1000 milliLiter(s) IV Continuous <Continuous>  gabapentin 400 milliGRAM(s) Oral three times a day  hydrochlorothiazide 12.5 milliGRAM(s) Oral daily  HYDROmorphone   Tablet 2 milliGRAM(s) Oral every 3 hours PRN  HYDROmorphone  Injectable 0.5 milliGRAM(s) IV Push every 10 minutes PRN  ketorolac   Injectable 15 milliGRAM(s) IV Push once PRN  lactated ringers. 1000 milliLiter(s) IV Continuous <Continuous>  lisinopril 20 milliGRAM(s) Oral daily  morphine  - Injectable 4 milliGRAM(s) IV Push every 3 hours PRN  ondansetron Injectable 4 milliGRAM(s) IV Push every 6 hours PRN  ondansetron Injectable 4 milliGRAM(s) IV Push once PRN  oxycodone    5 mG/acetaminophen 325 mG 1 Tablet(s) Oral every 4 hours PRN  oxycodone    5 mG/acetaminophen 325 mG 2 Tablet(s) Oral every 6 hours PRN  senna 2 Tablet(s) Oral at bedtime  sertraline 150 milliGRAM(s) Oral daily      Exam:  A&Ox3 NAD speech clear  PERRLA EOMI  Incision with mild staining  Motor 5/5 x 4 with slight decrease proximal L/E B/L 2/2 back pain  sensory intact to L/T  symetrical DTRs        DRAINS:  N/A        Assessment: S/P L4/5 5/1 TLIF w/PSF      Plan: Stable  Pain Control  Serial Neuro Checks  PT/Rehab  TLSO brace  D/W Attending

## 2022-02-11 ENCOUNTER — APPOINTMENT (OUTPATIENT)
Dept: GENERAL RADIOLOGY | Age: 57
End: 2022-02-11
Attending: PHYSICAL MEDICINE & REHABILITATION
Payer: MEDICARE

## 2022-02-11 ENCOUNTER — HOSPITAL ENCOUNTER (OUTPATIENT)
Age: 57
Setting detail: OUTPATIENT SURGERY
Discharge: HOME | End: 2022-02-11
Attending: PHYSICAL MEDICINE & REHABILITATION | Admitting: PHYSICAL MEDICINE & REHABILITATION
Payer: MEDICARE

## 2022-02-11 VITALS
TEMPERATURE: 97 F | BODY MASS INDEX: 28.87 KG/M2 | SYSTOLIC BLOOD PRESSURE: 155 MMHG | OXYGEN SATURATION: 93 % | HEART RATE: 92 BPM | WEIGHT: 190.5 LBS | RESPIRATION RATE: 18 BRPM | DIASTOLIC BLOOD PRESSURE: 73 MMHG | HEIGHT: 68 IN

## 2022-02-11 PROCEDURE — 3209999900 FLUORO FOR SURGICAL PROCEDURES

## 2022-02-11 PROCEDURE — 7100000011 HC PHASE II RECOVERY - ADDTL 15 MIN: Performed by: PHYSICAL MEDICINE & REHABILITATION

## 2022-02-11 PROCEDURE — 7100000010 HC PHASE II RECOVERY - FIRST 15 MIN: Performed by: PHYSICAL MEDICINE & REHABILITATION

## 2022-02-11 PROCEDURE — 3600000056 HC PAIN LEVEL 4 BASE: Performed by: PHYSICAL MEDICINE & REHABILITATION

## 2022-02-11 PROCEDURE — 2709999900 HC NON-CHARGEABLE SUPPLY: Performed by: PHYSICAL MEDICINE & REHABILITATION

## 2022-02-11 PROCEDURE — 6360000004 HC RX CONTRAST MEDICATION: Performed by: PHYSICAL MEDICINE & REHABILITATION

## 2022-02-11 PROCEDURE — 2500000003 HC RX 250 WO HCPCS: Performed by: PHYSICAL MEDICINE & REHABILITATION

## 2022-02-11 PROCEDURE — 64493 INJ PARAVERT F JNT L/S 1 LEV: CPT | Performed by: PHYSICAL MEDICINE & REHABILITATION

## 2022-02-11 PROCEDURE — 64494 INJ PARAVERT F JNT L/S 2 LEV: CPT | Performed by: PHYSICAL MEDICINE & REHABILITATION

## 2022-02-11 RX ORDER — LIDOCAINE HYDROCHLORIDE 20 MG/ML
INJECTION, SOLUTION EPIDURAL; INFILTRATION; INTRACAUDAL; PERINEURAL PRN
Status: DISCONTINUED | OUTPATIENT
Start: 2022-02-11 | End: 2022-02-11 | Stop reason: ALTCHOICE

## 2022-02-11 RX ORDER — LIDOCAINE HYDROCHLORIDE 10 MG/ML
INJECTION, SOLUTION EPIDURAL; INFILTRATION; INTRACAUDAL; PERINEURAL PRN
Status: DISCONTINUED | OUTPATIENT
Start: 2022-02-11 | End: 2022-02-11 | Stop reason: ALTCHOICE

## 2022-02-11 ASSESSMENT — PAIN DESCRIPTION - LOCATION
LOCATION: BACK
LOCATION: BACK

## 2022-02-11 ASSESSMENT — PAIN DESCRIPTION - ORIENTATION
ORIENTATION: RIGHT
ORIENTATION: RIGHT

## 2022-02-11 ASSESSMENT — PAIN DESCRIPTION - DESCRIPTORS
DESCRIPTORS: ACHING
DESCRIPTORS: ACHING
DESCRIPTORS: ACHING;SHARP

## 2022-02-11 ASSESSMENT — PAIN SCALES - GENERAL
PAINLEVEL_OUTOF10: 3
PAINLEVEL_OUTOF10: 3

## 2022-02-11 ASSESSMENT — PAIN DESCRIPTION - PAIN TYPE
TYPE: CHRONIC PAIN
TYPE: CHRONIC PAIN

## 2022-02-11 ASSESSMENT — PAIN - FUNCTIONAL ASSESSMENT: PAIN_FUNCTIONAL_ASSESSMENT: 0-10

## 2022-02-11 NOTE — OP NOTE
MEDIAL BRANCH BLOCK   diagnostic  2/11/22    Surgeon: Eboni Hernandez MD    Pre-operative Diagnosis:   Hospital Problems           Last Modified POA    Lumbar spondylolysis 2/11/2022 Yes    Lumbar facet joint syndrome 2/11/2022 Yes    Panniculitis involving lumbar region 2/11/2022 Yes          Post-operative Diagnosis: Same    INDICATION:Please see H&P for details on previous treatments, examination findings, and work up. right L4-5 5-S1 medial branch blocks are requested for diagnostic reasons. Conservative treatment was ineffective i.e.: ice, NSAIDS, rest, narcotic medication, chiropractic care, physical therapy and message therapy. Patient is unable to perform the following ADL's: ambulating and grooming     Pain Assessment: 0-10  Pain Level: 6     Pain Orientation: Right  Pain Location: Back  Pain Descriptors: Aching    Last Plain films: 2020    EXAMINATION:  right L4-5 -5S1 medial branch blocks. CONSENT:  Written consent was obtained from the patient on preprinted consent form after explaining the procedure, indications, potential complications and outcomes. Alternative treatments were also discussed. DISCUSSION:  The patient was sterilely prepped and draped in the usual fashion in the prone position. Time out was verified for correct patient, side, level and procedure. SEDATION:   No conscious sedation was performed during the procedure. The patient remained awake and conversed throughout the procedure. The patient underwent pulse oximetry and blood pressure monitoring independently by a trained observer, as well as by a physician. PROCEDURE:   Under image-intensifier control, 22 gauge needle x 5 inch spinal needles were directed into the Right  L4-5 5-S1 medial branches of the dorsal rami at the target points, according to SILVANA guidelines. Needle tip positions were confirmed with 0.2 mL of Omnipaque 240 contrast medium.  Then, 1mL of equal volumes of 0.75% bupivacaine // 2% lidocaine / and Celestone> were instilled at each site. EBL: no blood loss    SPECIMEN: none    The patient tolerated the procedure well and without complications and was noted to be in stable condition prior to discharge from the procedure center with discharge instructions. IMPRESSIONS:  1.     right L4-5 5-S1 medial branch blocks performed uneventfully. 2.      rightL4-5 5- S1 medial branch blocks decreased pain to a 3 on 0 to 10 numeric pain scale at 15 and 30 minutes after the procedure. RECOMMENDATIONS:  1. Complete and return the \"Post-Procedure Pain and Activity Diary.   2. Contact me for symptom exacerbation, fever or unusual symptoms. 4.      Post-procedure care according to verbal and written instructions at discharge. 3. Follow this block with physical therapy, as per MD directions. 4. Consider confirmatory MBB if there is > 80% pain relief and improved activity scores. POST-PROCEDURE LUMBAR/CERVICAL SPINE FLUOROSCOPIC IMAGE INTERPRETATION:    EXAMINATION: AP, lateral, and oblique views. FLUORO TIME: 12 seconds    DISCUSSION:  Spot views of the spine reveal normal alignment and segmentation. Spinal needles are positioned at the base of the SAP at the junction with the transverse process/sacral ala  OR center of  the articular pillars on PA and lateral views. Contrast at needle tip confirms satisfactory placement. Visualized spine reveals right See radiology report. Soft tissues reveal no abnormalities. IMPRESSION:  Satisfactory needle placement and contrast dispersal for right L4-5 5-S1 medial branch block.      Electronically signed by Gwyndolyn Felty, MD on 2/11/2022 at 12:42 PM

## 2022-02-11 NOTE — INTERVAL H&P NOTE
I have interviewed and examined the patient and reviewed the recent History and Physical.  There have been no changes to the recent H&P documentation. The surgical consent form has been signed. Last anticoagulant medication use was:24 hours    Premedication taken for contrast allergy? No    Valium taken for oral sedation? No    Outpatient Medications Marked as Taking for the 2/11/22 encounter [facility] Encounter)   Medication Sig Dispense Refill    ibuprofen (ADVIL;MOTRIN) 800 MG tablet Take 1 tablet by mouth every 6 hours as needed for Pain 120 tablet 3    metoprolol succinate (TOPROL XL) 25 MG extended release tablet Take 1 tablet by mouth 2 times daily      atorvastatin (LIPITOR) 80 MG tablet Take 80 mg by mouth daily      furosemide (LASIX) 20 MG tablet Take 20 mg by mouth 2 times daily      insulin aspart protamine-insulin aspart (NOVOLOG 70/30 FLEXPEN RELION) (70-30) 100 UNIT/ML injection Inject 8 Units into the skin 3 times daily       insulin glargine (LANTUS) 100 UNIT/ML injection vial Inject 40 Units into the skin daily       isosorbide mononitrate (IMDUR) 60 MG extended release tablet Take 60 mg by mouth daily      magnesium oxide (MAG-OX) 400 MG tablet Take 400 mg by mouth daily      pantoprazole sodium (PROTONIX) 40 MG PACK packet Take 40 mg by mouth every morning (before breakfast)         The patient understands the planned operation and its associated risks and benefits and agrees to proceed.         Electronically signed by Marcial Essex, MD on 2/11/2022 at 12:41 PM

## 2022-02-11 NOTE — INTERVAL H&P NOTE
I have interviewed and examined the patient and reviewed the recent History and Physical.  There have been no changes to the recent H&P documentation. The surgical consent form has been signed. Last anticoagulant medication use was:24 hours    Premedication taken for contrast allergy? No    Valium taken for oral sedation? No    Outpatient Medications Marked as Taking for the 2/11/22 encounter [facility] Encounter)   Medication Sig Dispense Refill    ibuprofen (ADVIL;MOTRIN) 800 MG tablet Take 1 tablet by mouth every 6 hours as needed for Pain 120 tablet 3    metoprolol succinate (TOPROL XL) 25 MG extended release tablet Take 1 tablet by mouth 2 times daily      atorvastatin (LIPITOR) 80 MG tablet Take 80 mg by mouth daily      furosemide (LASIX) 20 MG tablet Take 20 mg by mouth 2 times daily      insulin aspart protamine-insulin aspart (NOVOLOG 70/30 FLEXPEN RELION) (70-30) 100 UNIT/ML injection Inject 8 Units into the skin 3 times daily       insulin glargine (LANTUS) 100 UNIT/ML injection vial Inject 40 Units into the skin daily       isosorbide mononitrate (IMDUR) 60 MG extended release tablet Take 60 mg by mouth daily      magnesium oxide (MAG-OX) 400 MG tablet Take 400 mg by mouth daily      pantoprazole sodium (PROTONIX) 40 MG PACK packet Take 40 mg by mouth every morning (before breakfast)         The patient understands the planned operation and its associated risks and benefits and agrees to proceed.         Electronically signed by Hortensia Chatman MD on 2/11/2022 at 12:40 PM

## 2022-02-11 NOTE — H&P
Subjective:       Patient is here today for a diagnostic/therapeutic injection. 2/11/22    Active Hospital Problems    Diagnosis Date Noted    Panniculitis involving lumbar region [M54.06] 02/03/2022    Lumbar facet joint syndrome [M47.816] 02/03/2022    Lumbar spondylolysis [M43.06] 01/07/2022       HPI: Patient is here today for adiagnostic/therapeutic injection. The most recent [facility] Pain Management office visit notes have been reviewed and are unchanged. Review of Systems    No Known Allergies       Prior to Admission medications    Medication Sig Start Date End Date Taking?  Authorizing Provider   ibuprofen (ADVIL;MOTRIN) 800 MG tablet Take 1 tablet by mouth every 6 hours as needed for Pain 1/20/22  Yes SUDARSHAN Pacheco CNP   metoprolol succinate (TOPROL XL) 25 MG extended release tablet Take 1 tablet by mouth 2 times daily 8/27/21  Yes Historical Provider, MD   atorvastatin (LIPITOR) 80 MG tablet Take 80 mg by mouth daily   Yes Historical Provider, MD   furosemide (LASIX) 20 MG tablet Take 20 mg by mouth 2 times daily   Yes Historical Provider, MD   insulin aspart protamine-insulin aspart (NOVOLOG 70/30 FLEXPEN RELION) (70-30) 100 UNIT/ML injection Inject 8 Units into the skin 3 times daily    Yes Historical Provider, MD   insulin glargine (LANTUS) 100 UNIT/ML injection vial Inject 40 Units into the skin daily    Yes Historical Provider, MD   isosorbide mononitrate (IMDUR) 60 MG extended release tablet Take 60 mg by mouth daily   Yes Historical Provider, MD   magnesium oxide (MAG-OX) 400 MG tablet Take 400 mg by mouth daily   Yes Historical Provider, MD   pantoprazole sodium (PROTONIX) 40 MG PACK packet Take 40 mg by mouth every morning (before breakfast)   Yes Historical Provider, MD   ASPIRIN 81 PO Take 1 tablet by mouth daily    Historical Provider, MD       Past Medical History:   Diagnosis Date    Anxiety due to invasive procedure     CAD (coronary artery disease) [facility]    [facility] [address]     DDD (degenerative disc disease), lumbar     DM (diabetes mellitus), type 1 with hyperosmolarity ([address]     HTN (hypertension)     Hyperlipidemia     Ischemic cardiomyopathy     Lumbar radiculopathy     Sciatica        Past Surgical History:   Procedure Laterality Date    CARDIAC SURGERY      S/P Triple Bypass     CARPAL TUNNEL RELEASE      CORONARY ANGIOPLASTY WITH STENT PLACEMENT      PAIN MANAGEMENT PROCEDURE Right 1/7/2022    Right L4 L5 TRANSFORAMINAL performed by Diana Moore MD at [address] [place] [address] Right 1/28/2022    Right L4 L5  TRANSFORAMINAL performed by Diana Moore MD at [place] [address] andSocial History reviewed in the electronic medical record. Imaging: Reviewed available imaging in oursystem with the patient. No results found. Objective:     Vitals:    02/11/22 1225   BP: (!) 156/73   Pulse: 88   Resp: 16   Temp: 97.6 °F (36.4 °C)   SpO2: 97%          Physical Exam  Neurologic Exam  Ortho Exam    No results found for: WBC, HGB, HCT, PLT, CHOL, TRIG, HDL, LDLDIRECT, ALT, AST, NA, K, CL, CREATININE, BUN, CO2, TSH, PSA, INR, GLUF, LABA1C, LABMICR    Assessment:                            Active Hospital Problems    Diagnosis Date Noted    Panniculitis involving lumbar region [M54.06] 02/03/2022    Lumbar facet joint syndrome [M47.816] 02/03/2022    Lumbar spondylolysis [M43.06] 01/07/2022                  Plan:   Proceed with planned procedure  -Right L4-5 5-S1 diagnostic MBB    The patientunderstands the planned operation and its associated risks and benefits and agrees to proceed. The surgical consent form has been signed. Last NSAID/anticoagulant medication use was:1 day    Premedication taken for contrast allergy? No    Valium taken for oral sedation?  No

## 2022-02-24 ENCOUNTER — HOSPITAL ENCOUNTER (EMERGENCY)
Age: 57
Discharge: HOME | End: 2022-02-24
Attending: EMERGENCY MEDICINE
Payer: MEDICARE

## 2022-02-24 ENCOUNTER — HOSPITAL ENCOUNTER (OUTPATIENT)
Age: 57
Setting detail: OUTPATIENT SURGERY
Discharge: ANOTHER HOSPITAL | End: 2022-02-24
Attending: PHYSICAL MEDICINE & REHABILITATION | Admitting: PHYSICAL MEDICINE & REHABILITATION
Payer: MEDICARE

## 2022-02-24 ENCOUNTER — TELEPHONE (OUTPATIENT)
Dept: PAIN MANAGEMENT | Age: 57
End: 2022-02-24

## 2022-02-24 ENCOUNTER — APPOINTMENT (OUTPATIENT)
Dept: GENERAL RADIOLOGY | Age: 57
End: 2022-02-24
Attending: PHYSICAL MEDICINE & REHABILITATION
Payer: MEDICARE

## 2022-02-24 VITALS
BODY MASS INDEX: 28.79 KG/M2 | OXYGEN SATURATION: 98 % | RESPIRATION RATE: 16 BRPM | HEART RATE: 85 BPM | WEIGHT: 190 LBS | HEIGHT: 68 IN | SYSTOLIC BLOOD PRESSURE: 140 MMHG | DIASTOLIC BLOOD PRESSURE: 78 MMHG

## 2022-02-24 VITALS
OXYGEN SATURATION: 94 % | RESPIRATION RATE: 16 BRPM | DIASTOLIC BLOOD PRESSURE: 92 MMHG | SYSTOLIC BLOOD PRESSURE: 169 MMHG | TEMPERATURE: 98.1 F | HEART RATE: 80 BPM

## 2022-02-24 DIAGNOSIS — R07.9 CHEST PAIN, UNSPECIFIED TYPE: Primary | ICD-10-CM

## 2022-02-24 LAB
ABSOLUTE EOS #: 0.15 K/UL (ref 0–0.44)
ABSOLUTE IMMATURE GRANULOCYTE: 0.05 K/UL (ref 0–0.3)
ABSOLUTE LYMPH #: 2.72 K/UL (ref 1.1–3.7)
ABSOLUTE MONO #: 0.62 K/UL (ref 0.1–1.2)
ALBUMIN SERPL-MCNC: 4 G/DL (ref 3.5–5.2)
ALBUMIN/GLOBULIN RATIO: 1.8 (ref 1–2.5)
ALP BLD-CCNC: 84 U/L (ref 40–129)
ALT SERPL-CCNC: 14 U/L (ref 5–41)
ANION GAP SERPL CALCULATED.3IONS-SCNC: 8 MMOL/L (ref 9–17)
AST SERPL-CCNC: 12 U/L
BASOPHILS # BLD: 1 % (ref 0–2)
BASOPHILS ABSOLUTE: 0.04 K/UL (ref 0–0.2)
BILIRUB SERPL-MCNC: 0.49 MG/DL (ref 0.3–1.2)
BILIRUBIN DIRECT: 0.14 MG/DL
BILIRUBIN, INDIRECT: 0.35 MG/DL (ref 0–1)
BUN BLDV-MCNC: 15 MG/DL (ref 6–20)
BUN/CREAT BLD: 31 (ref 9–20)
CALCIUM SERPL-MCNC: 9.6 MG/DL (ref 8.6–10.4)
CHLORIDE BLD-SCNC: 105 MMOL/L (ref 98–107)
CO2: 26 MMOL/L (ref 20–31)
CREAT SERPL-MCNC: 0.49 MG/DL (ref 0.7–1.2)
EOSINOPHILS RELATIVE PERCENT: 2 % (ref 1–4)
GFR AFRICAN AMERICAN: >60 ML/MIN
GFR NON-AFRICAN AMERICAN: >60 ML/MIN
GFR SERPL CREATININE-BSD FRML MDRD: ABNORMAL ML/MIN/{1.73_M2}
GLOBULIN: 2.2 G/DL (ref 1.5–3.8)
GLUCOSE BLD-MCNC: 136 MG/DL (ref 70–99)
HCT VFR BLD CALC: 42.3 % (ref 40.7–50.3)
HEMOGLOBIN: 14.8 G/DL (ref 13–17)
IMMATURE GRANULOCYTES: 1 %
LACTIC ACID, SEPSIS: 0.9 MMOL/L (ref 0.5–1.9)
LIPASE: 18 U/L (ref 13–60)
LYMPHOCYTES # BLD: 35 % (ref 24–43)
MCH RBC QN AUTO: 32.6 PG (ref 25.2–33.5)
MCHC RBC AUTO-ENTMCNC: 35 G/DL (ref 25.2–33.5)
MCV RBC AUTO: 93.2 FL (ref 82.6–102.9)
MONOCYTES # BLD: 8 % (ref 3–12)
NRBC AUTOMATED: 0 PER 100 WBC
PDW BLD-RTO: 12.6 % (ref 11.8–14.4)
PLATELET # BLD: 201 K/UL (ref 138–453)
PMV BLD AUTO: 10.6 FL (ref 8.1–13.5)
POTASSIUM SERPL-SCNC: 3.9 MMOL/L (ref 3.7–5.3)
RBC # BLD: 4.54 M/UL (ref 4.21–5.77)
SEG NEUTROPHILS: 53 % (ref 36–65)
SEGMENTED NEUTROPHILS ABSOLUTE COUNT: 4.24 K/UL (ref 1.5–8.1)
SODIUM BLD-SCNC: 139 MMOL/L (ref 135–144)
TOTAL PROTEIN: 6.2 G/DL (ref 6.4–8.3)
TROPONIN, HIGH SENSITIVITY: 15 NG/L (ref 0–22)
WBC # BLD: 7.8 K/UL (ref 3.5–11.3)

## 2022-02-24 PROCEDURE — 83690 ASSAY OF LIPASE: CPT

## 2022-02-24 PROCEDURE — 84484 ASSAY OF TROPONIN QUANT: CPT

## 2022-02-24 PROCEDURE — 64493 INJ PARAVERT F JNT L/S 1 LEV: CPT | Performed by: PHYSICAL MEDICINE & REHABILITATION

## 2022-02-24 PROCEDURE — 2500000003 HC RX 250 WO HCPCS: Performed by: PHYSICAL MEDICINE & REHABILITATION

## 2022-02-24 PROCEDURE — 6360000004 HC RX CONTRAST MEDICATION: Performed by: PHYSICAL MEDICINE & REHABILITATION

## 2022-02-24 PROCEDURE — 64494 INJ PARAVERT F JNT L/S 2 LEV: CPT | Performed by: PHYSICAL MEDICINE & REHABILITATION

## 2022-02-24 PROCEDURE — 80048 BASIC METABOLIC PNL TOTAL CA: CPT

## 2022-02-24 PROCEDURE — 93005 ELECTROCARDIOGRAM TRACING: CPT | Performed by: PHYSICAL MEDICINE & REHABILITATION

## 2022-02-24 PROCEDURE — 85025 COMPLETE CBC W/AUTO DIFF WBC: CPT

## 2022-02-24 PROCEDURE — 83605 ASSAY OF LACTIC ACID: CPT

## 2022-02-24 PROCEDURE — 80076 HEPATIC FUNCTION PANEL: CPT

## 2022-02-24 RX ORDER — BUPIVACAINE HYDROCHLORIDE 7.5 MG/ML
INJECTION, SOLUTION EPIDURAL; RETROBULBAR PRN
Status: DISCONTINUED | OUTPATIENT
Start: 2022-02-24 | End: 2022-02-24 | Stop reason: ALTCHOICE

## 2022-02-24 ASSESSMENT — PAIN - FUNCTIONAL ASSESSMENT: PAIN_FUNCTIONAL_ASSESSMENT: 0-10

## 2022-02-24 ASSESSMENT — ENCOUNTER SYMPTOMS
ABDOMINAL PAIN: 1
NAUSEA: 1

## 2022-02-24 NOTE — TELEPHONE ENCOUNTER
Patient  Had to have his surgery today canceled as presented with  Chest pain,  Sent to ER and they  Have evaluated and conferred with his [facility]  Cardiologist  and stated can finish  This as outpatient. Please call patient to  Reschedule ASAP.    Thank You

## 2022-02-24 NOTE — PROGRESS NOTES
Patient complains of  Heat burn today  Has not had this  In  Months. Has  Both diagnosis of  GErD and taking prilosec,   Recently. States did present  In past  With heartburn and had  An MI requiring stent. EKG  shows  T changes   Will  Have evaluated by ER. Is in  Severe lumbar pain   For today even if evaluation for Myocardial infarction  Is negative   No  Interventional Spine Surgery today.     Plan    Transfer to Emergency,  If discharged  From  ER, will NOT perform  Procedure today

## 2022-02-24 NOTE — H&P
Subjective:       Patient is here today for a diagnostic/therapeutic injection. 2/11/22    Active Hospital Problems    Diagnosis Date Noted    Panniculitis involving lumbar region [M54.06] 02/03/2022    Lumbar spondylolysis [M43.06] 01/07/2022       HPI: Patient is here today for adiagnostic/therapeutic injection. The most recent [facility] Pain Management office visit notes have been reviewed and are unchanged. Review of Systems    No Known Allergies       Prior to Admission medications    Medication Sig Start Date End Date Taking?  Authorizing Provider   metoprolol succinate (TOPROL XL) 25 MG extended release tablet Take 1 tablet by mouth 2 times daily 8/27/21  Yes Historical Provider, MD   ASPIRIN 81 PO Take 1 tablet by mouth daily   Yes Historical Provider, MD   atorvastatin (LIPITOR) 80 MG tablet Take 80 mg by mouth daily   Yes Historical Provider, MD   furosemide (LASIX) 20 MG tablet Take 20 mg by mouth 2 times daily   Yes Historical Provider, MD   insulin aspart protamine-insulin aspart (NOVOLOG 70/30 FLEXPEN RELION) (70-30) 100 UNIT/ML injection Inject 8 Units into the skin 3 times daily    Yes Historical Provider, MD   insulin glargine (LANTUS) 100 UNIT/ML injection vial Inject 40 Units into the skin daily    Yes Historical Provider, MD   isosorbide mononitrate (IMDUR) 60 MG extended release tablet Take 60 mg by mouth daily   Yes Historical Provider, MD   magnesium oxide (MAG-OX) 400 MG tablet Take 400 mg by mouth daily   Yes Historical Provider, MD   pantoprazole sodium (PROTONIX) 40 MG PACK packet Take 40 mg by mouth every morning (before breakfast)   Yes Historical Provider, MD   ibuprofen (ADVIL;MOTRIN) 800 MG tablet Take 1 tablet by mouth every 6 hours as needed for Pain 1/20/22   Brittanie Sosa APRN - CNP       Past Medical History:   Diagnosis Date    Anxiety due to invasive procedure     CAD (coronary artery disease)     [facility]    [facility] failure) [facility] [address]     DDD (degenerative disc disease), lumbar     DM (diabetes mellitus), type 1 with hyperosmolarity ([address]     HTN (hypertension)     Hyperlipidemia     Ischemic cardiomyopathy     Lumbar radiculopathy     Sciatica        Past Surgical History:   Procedure Laterality Date    CARDIAC SURGERY      S/P Triple Bypass     CARPAL TUNNEL RELEASE      CORONARY ANGIOPLASTY WITH STENT PLACEMENT      NERVE BLOCK Right 2/11/2022    Right L4-L5 L5-S1 Diagnostic Medial Branch Block performed by Talon Hagan MD at [address] [place] [address] Right 1/7/2022    Right L4 L5 TRANSFORAMINAL performed by Talon Hagan MD at [address] [place] [address] Right 1/28/2022    Right L4 L5  TRANSFORAMINAL performed by Talon Hagan MD at [place] [address] andSocial History reviewed in the electronic medical record. Imaging: Reviewed available imaging in oursystem with the patient. No results found. Objective:     Vitals:    02/24/22 0922   BP: (!) 140/78   Pulse: 85   Resp: 16   SpO2: 98%          Physical Exam  Neurologic Exam  Ortho Exam    No results found for: WBC, HGB, HCT, PLT, CHOL, TRIG, HDL, LDLDIRECT, ALT, AST, NA, K, CL, CREATININE, BUN, CO2, TSH, PSA, INR, GLUF, LABA1C, LABMICR    Assessment:                            Active Hospital Problems    Diagnosis Date Noted    Panniculitis involving lumbar region [M54.06] 02/03/2022    Lumbar spondylolysis [M43.06] 01/07/2022                  Plan:   Proceed with planned procedure  -Right L4-5 5-S1 diagnostic MBB    The patientunderstands the planned operation and its associated risks and benefits and agrees to proceed. The surgical consent form has been signed. Last NSAID/anticoagulant medication use was:1 day    Premedication taken for contrast allergy? No    Valium taken for oral sedation?  No

## 2022-02-24 NOTE — ED PROVIDER NOTES
[facility] ED  [address]  [place] [address]  Phone: [phone]        Pt Name: Clarke De La Fuente  MRN: [id number]  Zach 1965  Date of evaluation: 2/24/22      CHIEF COMPLAINT     Chief Complaint   Patient presents with    Chest Pain     pt sent down from OR with hx of chest pain/heartburn for 2 weeks. HISTORY OF PRESENT ILLNESS  (Location/Symptom, Timing/Onset, Context/Setting, Quality, Duration, Modifying Factors, Severity.)    Clarke De La Fuente is a 61 y.o. male who presents chest pain reflux abdominal discomfort. The patient states that he has a history of coronary artery disease as well as a history of reflux over the last several days he has had reflux with some abdominal pain chest discomfort he was going to pain management to get an injection he told them that he was having some chest discomfort they got an EKG that showed some inverted T waves so sent him to the ER for evaluation the patient states that taking some of his reflux medications makes his symptoms better otherwise nothing else changes his symptoms no shortness of breath no fever no chills no vomiting no diarrhea no blood in the urine or stool      REVIEW OF SYSTEMS    (2-9 systems for level 4, 10 or more for level 5)     Review of Systems   Cardiovascular: Positive for chest pain. Gastrointestinal: Positive for abdominal pain and nausea. All other systems reviewed and are negative. PAST MEDICAL HISTORY    has a past medical history of Anxiety due to invasive procedure, CAD (coronary artery disease), CHF (congestive heart failure) ([address] DDD (degenerative disc disease), lumbar, DM (diabetes mellitus), type 1 with hyperosmolarity ([address] HTN (hypertension), Hyperlipidemia, Ischemic cardiomyopathy, Lumbar radiculopathy, and Sciatica.     SURGICAL HISTORY      has a past surgical history that includes Carpal tunnel release; Coronary angioplasty with stent; Cardiac surgery; Pain management procedure (Right, 2022); Pain management procedure (Right, 2022); and Nerve Block (Right, 2022). CURRENTMEDICATIONS       Previous Medications    ASPIRIN 81 PO    Take 1 tablet by mouth daily    ATORVASTATIN (LIPITOR) 80 MG TABLET    Take 80 mg by mouth daily    FUROSEMIDE (LASIX) 20 MG TABLET    Take 20 mg by mouth 2 times daily    IBUPROFEN (ADVIL;MOTRIN) 800 MG TABLET    Take 1 tablet by mouth every 6 hours as needed for Pain    INSULIN ASPART PROTAMINE-INSULIN ASPART (NOVOLOG 70/30 FLEXPEN RELION) (70-30) 100 UNIT/ML INJECTION    Inject 8 Units into the skin 3 times daily     INSULIN GLARGINE (LANTUS) 100 UNIT/ML INJECTION VIAL    Inject 40 Units into the skin daily     ISOSORBIDE MONONITRATE (IMDUR) 60 MG EXTENDED RELEASE TABLET    Take 60 mg by mouth daily    MAGNESIUM OXIDE (MAG-OX) 400 MG TABLET    Take 400 mg by mouth daily    METOPROLOL SUCCINATE (TOPROL XL) 25 MG EXTENDED RELEASE TABLET    Take 1 tablet by mouth 2 times daily    PANTOPRAZOLE SODIUM (PROTONIX) 40 MG PACK PACKET    Take 40 mg by mouth every morning (before breakfast)       ALLERGIES     has No Known Allergies. FAMILY HISTORY     He indicated that his mother is . He indicated that his father is . family history includes Diabetes in his father; Heart Disease in his mother. SOCIAL HISTORY      reports that he has been smoking cigarettes. He has a 38.00 pack-year smoking history. He has never used smokeless tobacco. He reports previous alcohol use. He reports that he does not use drugs. PHYSICAL EXAM    (up to 7 for level 4, 8 or more for level 5)   INITIAL VITALS:  blood pressure is 176/92 (abnormal) and his pulse is 80. His respiration is 16 and oxygen saturation is 98%. Physical Exam  Vitals and nursing note reviewed. Constitutional:       Appearance: Normal appearance. HENT:      Head: Normocephalic and atraumatic.    Eyes:      Conjunctiva/sclera: Conjunctivae normal.   Cardiovascular: Rate and Rhythm: Normal rate and regular rhythm. Pulses: Normal pulses. Heart sounds: Normal heart sounds. Pulmonary:      Effort: Pulmonary effort is normal. No respiratory distress. Breath sounds: Normal breath sounds. No stridor. No wheezing, rhonchi or rales. Abdominal:      General: Bowel sounds are normal. There is no distension. Palpations: Abdomen is soft. There is no mass. Tenderness: There is abdominal tenderness. There is no right CVA tenderness, left CVA tenderness, guarding or rebound. Comments: Diffuse abdominal tenderness in the epigastric as well as lower abdomen   Musculoskeletal:         General: No swelling or tenderness. Normal range of motion. Cervical back: Normal range of motion and neck supple. No rigidity or tenderness. Lymphadenopathy:      Cervical: No cervical adenopathy. Skin:     General: Skin is warm and dry. Findings: No rash. Neurological:      General: No focal deficit present. Mental Status: He is alert. DIFFERENTIAL DIAGNOSIS/ MDM:     We will establish an IV I will compare old EKGs we will get lab work and imaging    DIAGNOSTIC RESULTS     EKG: All EKG's are interpreted by the Emergency Department Physician who either signs or Co-signs this chart in the absence of a cardiologist.      Interpreted by Rhea Reza MD     Rhythm: normal sinus   Rate: 79  Axis: 96  Ectopy: none  Conduction: normal  ST Segments: no acute change  T Waves: Inverted T waves in multiple leads that is not significantly changed from an EKG compared December 9, 2019  Q Waves: none    Clinical Impression: normal sinus rhythm with no acute changes/normal EKG. No acute infarction/ischemia noted.         LABS:  Results for orders placed or performed during the hospital encounter of 56/85/53   Basic Metabolic Panel   Result Value Ref Range    Glucose 136 (H) 70 - 99 mg/dL    BUN 15 6 - 20 mg/dL    CREATININE 0.49 (L) 0.70 - 1.20 mg/dL Bun/Cre Ratio 31 (H) 9 - 20    Calcium 9.6 8.6 - 10.4 mg/dL    Sodium 139 135 - 144 mmol/L    Potassium 3.9 3.7 - 5.3 mmol/L    Chloride 105 98 - 107 mmol/L    CO2 26 20 - 31 mmol/L    Anion Gap 8 (L) 9 - 17 mmol/L    GFR Non-African American >60 >60 mL/min    GFR African American >60 >60 mL/min    GFR Comment         CBC with Auto Differential   Result Value Ref Range    WBC 7.8 3.5 - 11.3 k/uL    RBC 4.54 4.21 - 5.77 m/uL    Hemoglobin 14.8 13.0 - 17.0 g/dL    Hematocrit 42.3 40.7 - 50.3 %    MCV 93.2 82.6 - 102.9 fL    MCH 32.6 25.2 - 33.5 pg    MCHC 35.0 (H) 25.2 - 33.5 g/dL    RDW 12.6 11.8 - 14.4 %    Platelets 090 646 - 577 k/uL    MPV 10.6 8.1 - 13.5 fL    NRBC Automated 0.0 0.0 per 100 WBC    Seg Neutrophils 53 36 - 65 %    Lymphocytes 35 24 - 43 %    Monocytes 8 3 - 12 %    Eosinophils % 2 1 - 4 %    Basophils 1 0 - 2 %    Immature Granulocytes 1 (H) 0 %    Segs Absolute 4.24 1.50 - 8.10 k/uL    Absolute Lymph # 2.72 1.10 - 3.70 k/uL    Absolute Mono # 0.62 0.10 - 1.20 k/uL    Absolute Eos # 0.15 0.00 - 0.44 k/uL    Basophils Absolute 0.04 0.00 - 0.20 k/uL    Absolute Immature Granulocyte 0.05 0.00 - 0.30 k/uL   Hepatic Function Panel   Result Value Ref Range    Albumin 4.0 3.5 - 5.2 g/dL    Alkaline Phosphatase 84 40 - 129 U/L    ALT 14 5 - 41 U/L    AST 12 <40 U/L    Total Bilirubin 0.49 0.3 - 1.2 mg/dL    Bilirubin, Direct 0.14 <0.31 mg/dL    Bilirubin, Indirect 0.35 0.00 - 1.00 mg/dL    Total Protein 6.2 (L) 6.4 - 8.3 g/dL    Globulin 2.2 1.5 - 3.8 g/dL    Albumin/Globulin Ratio 1.8 1.0 - 2.5   Lipase   Result Value Ref Range    Lipase 18 13 - 60 U/L   Troponin   Result Value Ref Range    Troponin, High Sensitivity 15 0 - 22 ng/L   Lactate, Sepsis   Result Value Ref Range    Lactic Acid, Sepsis 0.9 0.5 - 1.9 mmol/L           EMERGENCY DEPARTMENT COURSE:   Vitals:    Vitals:    02/24/22 1022 02/24/22 1030   BP: (!) 156/98 (!) 176/92   Pulse: 83 80   Resp: 16 16   SpO2: 98% 98% -------------------------  BP: (!) 176/92,  , Pulse: 80, Resp: 16      RE-EVALUATION:  The patient presents with chest pain reflux type symptoms of a couple of days duration work-up here in the emergency department is unremarkable given this the patient will follow up with his cardiologist he is to discuss this with his family doctor who manages his reflux medications and he is to call his cardiologist for follow-up  The patient presents with chest pain that is not suggesting in nature of pulmonary embolus, aortic dissection, cardiac ischemia, or other serious etiology. I considered an aortic dissection, but this is unlikely as patient is not complaining of tearing or ripping chest pain that is radiating to the back, the patient has no new neurological abnormalities and pulses are equal to all extremities. Mediastinum is within normal limits. Patient appears comfortable on physical exam and is not in distress. I also thought about a cardiac tamponade, but this is unlikely as patient is hemodynamically stable. Heart sounds are not distant, EKG does not show signs of electrical alternans and there is no JVD. I also thought about a tension pneumothorax, but this is unlikely given bilateral breath sounds and no signs of hemodynamic instability. I do not feel the patient has a PE. No clinical evidence of DVT. I thought about an esophageal perforation, but history and physical exam does not suggest vomiting, followed by chest pain. No signs of Hamman's crunch on physical exam; again, patient appears comfortable and is well appearing and non toxic. The patient has been instructed to return if the symptpoms change or worsen in any way. Given the extremely low risk of these diagnoses further testing and evaluation for these possibilites are not indicated at this time.  The patient appears stable for discharge and has been instructed to return immediately if the symptoms worsen in any way, or in 8-12 hr if not improved for re-evaluation. We also discussed returning to the Emergency Department immediately if new or worsening symptoms occur. We have discussed the symptoms which are most concerning (e.g., worsening pain, shortness of breath, a feeling of passing out, fever, any neurologic symptoms, abdominal pain or vomiting) that necessitate immediate return. The patient understands that at this time there is no evidence for a more malignant underlying process, but the patient also understands that early in the process of an illness or injury, an emergency department workup can be falsely reassuring. Routine discharge counseling was given, and the patient understands that worsening, changing or persistent symptoms should prompt an immediate call or follow up with their primary physician or return to the emergency department. The importance of appropriate follow up was also discussed. I have reviewed the disposition diagnosis with the patient and or their family/guardian. I have answered their questions and given discharge instructions. They voiced understanding of these instructions and did not have any further questions or complaints. CONSULTS:  Did discuss the patient with his cardiologist and they reviewed the EKG with the we obtained here in comparison to their files and saw no significant changes with a negative work-up at this point they feel that the patient can follow this up as an outpatient    PROCEDURES:  None    FINAL IMPRESSION      1.  Chest pain, unspecified type          DISPOSITION/PLAN   DISPOSITION        CONDITION ON DISPOSITION:   Stable    PATIENT REFERRED TO:  SUDARSHAN Barraza - CNP  [address]  [place]  [phone]    Call in 1 day        DISCHARGE MEDICATIONS:  New Prescriptions    No medications on file       (Please note that portions of this note were completed with a voicerecognition program.  Efforts were made to edit the dictations but occasionally words are mis-transcribed.)    Sherly Eduardo MD,, MD, F.A.C.E.P.   Attending Emergency Medicine Physician       Sherly Eduardo MD  02/24/22 [id number]

## 2022-02-24 NOTE — OP NOTE
MEDIAL [facility]  Confirmatory  Surgeon: Idris Alvares MD    Pre-operative Diagnosis:   Hospital Problems           Last Modified POA    * (Principal) Panniculitis involving lumbar region 2/24/2022 Yes    Lumbar spondylolysis 2/24/2022 Yes          Post-operative Diagnosis: Same    INDICATION:Please see H&P for details on previous treatments, examination findings, and work up. right SL4-5 5-N6nciacl branch blocks are requested for diagnostic reasons. Conservative treatment was ineffective i.e.: ice, NSAIDS, rest, narcotic medication, chiropractic care, physical therapy and message therapy. Patient is unable to perform the following ADL's: ambulating and grooming     Pain Assessment: 0-10  Pain Level: 9     Pain Orientation: Lower  Pain Location: Back       Last Plain films: 2020    EXAMINATION:  right L4-5 5- S1 medial branch blocks. CONSENT:  Written consent was obtained from the patient on preprinted consent form after explaining the procedure, indications, potential complications and outcomes. Alternative treatments were also discussed. DISCUSSION:  The patient was sterilely prepped and draped in the usual fashion in the prone position. Time out was verified for correct patient, side, level and procedure. SEDATION:   No conscious sedation was performed during the procedure. The patient remained awake and conversed throughout the procedure. The patient underwent pulse oximetry and blood pressure monitoring independently by a trained observer, as well as by a physician. PROCEDURE:   Under image-intensifier control, 22 gauge needle x 5 inch spinal needles were directed into the right L4-5 5- S1 medial branches of the dorsal rami at the target points, according to SILVANA guidelines. Needle tip positions were confirmed with 0.2 mL of Omnipaque 240 contrast medium. Then, 1mL of equal volumes of 0.75% bupivacaine // 2% lidocaine / and Celestone> were instilled at each site.      EBL: no blood loss    SPECIMEN: none    The patient tolerated the procedure well and without complications and was noted to be in stable condition prior to discharge from the procedure center with discharge instructions. IMPRESSIONS:  1.     right L4-5 5-S1 medial branch blocks performed uneventfully. 2.      rightL4-5 5- S1 medial branch blocks decreased pain to a 1 on 0 to 10 numeric pain scale at 15 and 30 minutes after the procedure. RECOMMENDATIONS:  1. Complete and return the \"Post-Procedure Pain and Activity Diary.   2. Contact me for symptom exacerbation, fever or unusual symptoms. 4.      Post-procedure care according to verbal and written instructions at discharge. 3. Follow this block with physical therapy, as per MD directions. 4. Consider confirmatory MBB if there is > 80% pain relief and improved activity scores. POST-PROCEDURE LUMBAR/CERVICAL SPINE FLUOROSCOPIC IMAGE INTERPRETATION:    EXAMINATION: AP, lateral, and oblique views. FLUORO TIME: 21 seconds    DISCUSSION:  Spot views of the spine reveal normal alignment and segmentation. Spinal needles are positioned at the base of the SAP at the junction with the transverse process/sacral ala  OR center of  the articular pillars on PA and lateral views. Contrast at needle tip confirms satisfactory placement. Visualized spine reveals right See radiology report. Soft tissues reveal no abnormalities. IMPRESSION:  Satisfactory needle placement and contrast dispersal for right L4-5 5- S1 medial branch block.      Electronically signed by Kevin Church MD on 2/24/2022 at 9:40 AM

## 2022-02-25 LAB
EKG ATRIAL RATE: 79 BPM
EKG P AXIS: 40 DEGREES
EKG P-R INTERVAL: 152 MS
EKG Q-T INTERVAL: 370 MS
EKG QRS DURATION: 98 MS
EKG QTC CALCULATION (BAZETT): 424 MS
EKG R AXIS: 96 DEGREES
EKG T AXIS: -150 DEGREES
EKG VENTRICULAR RATE: 79 BPM

## 2022-03-03 ENCOUNTER — HOSPITAL ENCOUNTER (OUTPATIENT)
Age: 57
Setting detail: OUTPATIENT SURGERY
Discharge: HOME | End: 2022-03-03
Attending: PHYSICAL MEDICINE & REHABILITATION | Admitting: PHYSICAL MEDICINE & REHABILITATION
Payer: MEDICARE

## 2022-03-03 ENCOUNTER — TELEPHONE (OUTPATIENT)
Dept: PAIN MANAGEMENT | Age: 57
End: 2022-03-03

## 2022-03-03 ENCOUNTER — APPOINTMENT (OUTPATIENT)
Dept: GENERAL RADIOLOGY | Age: 57
End: 2022-03-03
Attending: PHYSICAL MEDICINE & REHABILITATION
Payer: MEDICARE

## 2022-03-03 VITALS
RESPIRATION RATE: 16 BRPM | TEMPERATURE: 96.8 F | DIASTOLIC BLOOD PRESSURE: 75 MMHG | OXYGEN SATURATION: 98 % | BODY MASS INDEX: 28.79 KG/M2 | HEART RATE: 79 BPM | HEIGHT: 68 IN | SYSTOLIC BLOOD PRESSURE: 156 MMHG | WEIGHT: 190 LBS

## 2022-03-03 PROCEDURE — 7100000011 HC PHASE II RECOVERY - ADDTL 15 MIN: Performed by: PHYSICAL MEDICINE & REHABILITATION

## 2022-03-03 PROCEDURE — 2709999900 HC NON-CHARGEABLE SUPPLY: Performed by: PHYSICAL MEDICINE & REHABILITATION

## 2022-03-03 PROCEDURE — 6360000004 HC RX CONTRAST MEDICATION: Performed by: PHYSICAL MEDICINE & REHABILITATION

## 2022-03-03 PROCEDURE — 3209999900 FLUORO FOR SURGICAL PROCEDURES

## 2022-03-03 PROCEDURE — 7100000010 HC PHASE II RECOVERY - FIRST 15 MIN: Performed by: PHYSICAL MEDICINE & REHABILITATION

## 2022-03-03 PROCEDURE — 64494 INJ PARAVERT F JNT L/S 2 LEV: CPT | Performed by: PHYSICAL MEDICINE & REHABILITATION

## 2022-03-03 PROCEDURE — 64493 INJ PARAVERT F JNT L/S 1 LEV: CPT | Performed by: PHYSICAL MEDICINE & REHABILITATION

## 2022-03-03 PROCEDURE — 3600000056 HC PAIN LEVEL 4 BASE: Performed by: PHYSICAL MEDICINE & REHABILITATION

## 2022-03-03 PROCEDURE — 2500000003 HC RX 250 WO HCPCS: Performed by: PHYSICAL MEDICINE & REHABILITATION

## 2022-03-03 RX ORDER — BUPIVACAINE HYDROCHLORIDE 7.5 MG/ML
INJECTION, SOLUTION EPIDURAL; RETROBULBAR PRN
Status: DISCONTINUED | OUTPATIENT
Start: 2022-03-03 | End: 2022-03-03 | Stop reason: ALTCHOICE

## 2022-03-03 ASSESSMENT — PAIN SCALES - GENERAL: PAINLEVEL_OUTOF10: 4

## 2022-03-03 ASSESSMENT — ENCOUNTER SYMPTOMS
BACK PAIN: 1
CONSTIPATION: 0
RESPIRATORY NEGATIVE: 1
ALLERGIC/IMMUNOLOGIC NEGATIVE: 1
NAUSEA: 0
EYES NEGATIVE: 1

## 2022-03-03 ASSESSMENT — PAIN DESCRIPTION - DESCRIPTORS: DESCRIPTORS: OTHER (COMMENT)

## 2022-03-03 ASSESSMENT — PAIN - FUNCTIONAL ASSESSMENT: PAIN_FUNCTIONAL_ASSESSMENT: 0-10

## 2022-03-03 NOTE — INTERVAL H&P NOTE
I have interviewed and examined the patient and reviewed the recent History and Physical.  There have been no changes to the recent H&P documentation. The surgical consent form has been signed. Last anticoagulant medication use was:na    Premedication taken for contrast allergy? No    Valium taken for oral sedation? No    Outpatient Medications Marked as Taking for the 3/3/22 encounter [facility] Encounter)   Medication Sig Dispense Refill    ibuprofen (ADVIL;MOTRIN) 800 MG tablet Take 1 tablet by mouth every 6 hours as needed for Pain 120 tablet 3    metoprolol succinate (TOPROL XL) 25 MG extended release tablet Take 1 tablet by mouth 2 times daily      ASPIRIN 81 PO Take 1 tablet by mouth daily      atorvastatin (LIPITOR) 80 MG tablet Take 80 mg by mouth daily      furosemide (LASIX) 20 MG tablet Take 20 mg by mouth 2 times daily      insulin aspart protamine-insulin aspart (NOVOLOG 70/30 FLEXPEN RELION) (70-30) 100 UNIT/ML injection Inject 8 Units into the skin 3 times daily       insulin glargine (LANTUS) 100 UNIT/ML injection vial Inject 40 Units into the skin daily       isosorbide mononitrate (IMDUR) 60 MG extended release tablet Take 60 mg by mouth daily      magnesium oxide (MAG-OX) 400 MG tablet Take 400 mg by mouth daily      pantoprazole sodium (PROTONIX) 40 MG PACK packet Take 40 mg by mouth every morning (before breakfast)         The patient understands the planned operation and its associated risks and benefits and agrees to proceed.         Electronically signed by Daphne Pérez MD on 3/3/2022 at 10:28 AM

## 2022-03-03 NOTE — H&P
Subjective:       Patient is here today for a diagnostic/therapeutic injection. 3/3/22    Active Hospital Problems    Diagnosis Date Noted    Panniculitis involving lumbar region [M54.06] 02/03/2022    Lumbar facet joint syndrome [M47.816] 02/03/2022       HPI: Patient is here today for adiagnostic/therapeutic injection. The most recent [facility] Pain Management office visit notes have been reviewed and are unchanged. Review of Systems   Constitutional: Positive for fatigue. HENT: Negative. Eyes: Negative. Respiratory: Negative. Cardiovascular: Negative. Gastrointestinal: Negative for constipation and nausea. Endocrine: Negative. Genitourinary: Negative for difficulty urinating. Musculoskeletal: Positive for arthralgias, back pain and myalgias. Skin: Negative. Allergic/Immunologic: Negative. Neurological: Positive for weakness and numbness. Hematological: Negative. Psychiatric/Behavioral: Positive for sleep disturbance. All other systems reviewed and are negative. No Known Allergies       Prior to Admission medications    Medication Sig Start Date End Date Taking?  Authorizing Provider   ibuprofen (ADVIL;MOTRIN) 800 MG tablet Take 1 tablet by mouth every 6 hours as needed for Pain 1/20/22   SUDARSHAN Gomez CNP   metoprolol succinate (TOPROL XL) 25 MG extended release tablet Take 1 tablet by mouth 2 times daily 8/27/21   Historical Provider, MD   ASPIRIN 81 PO Take 1 tablet by mouth daily    Historical Provider, MD   atorvastatin (LIPITOR) 80 MG tablet Take 80 mg by mouth daily    Historical Provider, MD   furosemide (LASIX) 20 MG tablet Take 20 mg by mouth 2 times daily    Historical Provider, MD   insulin aspart protamine-insulin aspart (NOVOLOG 70/30 FLEXPEN RELION) (70-30) 100 UNIT/ML injection Inject 8 Units into the skin 3 times daily     Historical Provider, MD   insulin glargine (LANTUS) 100 UNIT/ML injection vial Inject 40 Units into the skin daily     Historical Provider, MD   isosorbide mononitrate (IMDUR) 60 MG extended release tablet Take 60 mg by mouth daily    Historical Provider, MD   magnesium oxide (MAG-OX) 400 MG tablet Take 400 mg by mouth daily    Historical Provider, MD   pantoprazole sodium (PROTONIX) 40 MG PACK packet Take 40 mg by mouth every morning (before breakfast)    Historical Provider, MD       Past Medical History:   Diagnosis Date    Anxiety due to invasive procedure     CAD (coronary artery disease)     [facility]    [facility]     DDD (degenerative disc disease), lumbar     DM (diabetes mellitus), type 1 with hyperosmolarity ([facility]     HTN (hypertension)     Hyperlipidemia     Ischemic cardiomyopathy     Lumbar radiculopathy     Sciatica        Past Surgical History:   Procedure Laterality Date    CARDIAC SURGERY      S/P Triple Bypass     CARPAL TUNNEL RELEASE      CORONARY ANGIOPLASTY WITH STENT PLACEMENT      NERVE BLOCK Right 2/11/2022    Right L4-L5 L5-S1 Diagnostic Medial Branch Block performed by Danyell Raza MD at [place] [address] Right 1/7/2022    Right L4 L5 TRANSFORAMINAL performed by Danyell Raza MD at [place] [address] Right 1/28/2022    Right L4 L5  TRANSFORAMINAL performed by Danyell Raza MD at [place] [address] andSocial History reviewed in the electronic medical record. Imaging: Reviewed available imaging in oursystem with the patient. No results found. Objective: There were no vitals filed for this visit. Physical Exam  Constitutional:       Appearance: He is well-developed. HENT:      Head: Normocephalic and atraumatic. Cardiovascular:      Pulses: Normal pulses. Comments: Warm extremities. Normal capillary refill. Pulmonary:      Effort: Pulmonary effort is normal.   Abdominal:      General: Abdomen is flat. Palpations: Abdomen is soft.    Musculoskeletal:      Lumbar back: Negative right straight leg raise test and negative left straight leg raise test.   Skin:     General: Skin is warm and dry. Neurological:      General: No focal deficit present. Mental Status: He is alert and oriented to person, place, and time. Cranial Nerves: No cranial nerve deficit. Sensory: No sensory deficit. Motor: No atrophy or abnormal muscle tone. Deep Tendon Reflexes: Reflexes are normal and symmetric. Psychiatric:         Mood and Affect: Mood normal.         Speech: Speech normal.         Behavior: Behavior normal.       Neurologic Exam     Mental Status   Oriented to person, place, and time. Speech: speech is normal     Motor Exam     Strength   Right quadriceps: 5/5  Left quadriceps: 5/5  Right hamstrin/5  Left hamstrin/5    Back Exam     Tenderness   The patient is experiencing tenderness in the lumbar (+kemps Right ).     Range of Motion   Extension: normal   Flexion: normal   Lateral bend right: normal   Lateral bend left: normal   Rotation right: normal   Rotation left: normal     Muscle Strength   Right Quadriceps:  5/5   Left Quadriceps:  5/5   Right Hamstrings:  5/5   Left Hamstrings:  5/5     Tests   Straight leg raise right: negative  Straight leg raise left: negative    Other   Toe walk: normal  Heel walk: normal  Sensation: normal  Gait: normal             Lab Results   Component Value Date    WBC 7.8 2022    HGB 14.8 2022    HCT 42.3 2022     2022    ALT 14 2022    AST 12 2022     2022    K 3.9 2022     2022    CREATININE 0.49 (L) 2022    BUN 15 2022    CO2 26 2022       Assessment:                            Active Hospital Problems    Diagnosis Date Noted    Panniculitis involving lumbar region [M54.06] 2022    Lumbar facet joint syndrome [M47.816] 2022                  Plan:   Proceed with planned procedure  - Right L4-5 5-S1 Confirmatory MBB    The patientunderstands the planned operation and its associated risks and benefits and agrees to proceed. The surgical consent form has been signed. Last NSAID/anticoagulant medication use was:na    Premedication taken for contrast allergy? No    Valium taken for oral sedation?  No

## 2022-03-03 NOTE — OP NOTE
MEDIAL BRANCH BLOCK   Confirmatory   3/3/22    Surgeon: Madelin Lopes MD    Pre-operative Diagnosis:   Hospital Problems           Last Modified POA    Lumbar facet joint syndrome 3/3/2022 Yes    Panniculitis involving lumbar region 3/3/2022 Yes          Post-operative Diagnosis: Same    INDICATION:Please see H&P for details on previous treatments, examination findings, and work up. Right  L4-5 5- S1 medial branch blocks are requested for diagnostic reasons. Conservative treatment was ineffective i.e.: ice, NSAIDS, rest, narcotic medication, chiropractic care, physical therapy and message therapy. Patient is unable to perform the following ADL's: ambulating and grooming                         Last Plain films: 2020    EXAMINATION:  right L4-5 5- S1 medial branch blocks. CONSENT:  Written consent was obtained from the patient on preprinted consent form after explaining the procedure, indications, potential complications and outcomes. Alternative treatments were also discussed. DISCUSSION:  The patient was sterilely prepped and draped in the usual fashion in the prone position. Time out was verified for correct patient, side, level and procedure. SEDATION:   No conscious sedation was performed during the procedure. The patient remained awake and conversed throughout the procedure. The patient underwent pulse oximetry and blood pressure monitoring independently by a trained observer, as well as by a physician. PROCEDURE:   Under image-intensifier control, 22 gauge needle x 5 inch spinal needles were directed into the right L4-5 5-S1 medial branches of the dorsal rami at the target points, according to SILVANA guidelines. Needle tip positions were confirmed with 0.2 mL of Omnipaque 240 contrast medium. Then, 1mL of equal volumes of 0.75% bupivacaine // 2% lidocaine / and Celestone> were instilled at each site.      EBL: no blood loss    SPECIMEN: none    The patient tolerated the procedure well and without complications and was noted to be in stable condition prior to discharge from the procedure center with discharge instructions. IMPRESSIONS:  1.     right L4-5 5- S1 medial branch blocks performed uneventfully. 2.      rightL4-5 5- S1 medial branch blocks decreased pain to a 2 on 0 to 10 numeric pain scale at 15 and 30 minutes after the procedure. RECOMMENDATIONS:  1. Complete and return the \"Post-Procedure Pain and Activity Diary.   2. Contact me for symptom exacerbation, fever or unusual symptoms. 4.      Post-procedure care according to verbal and written instructions at discharge. 3. Follow this block with physical therapy, as per MD directions. 4. Consider confirmatory MBB if there is > 80% pain relief and improved activity scores. POST-PROCEDURE LUMBAR/CERVICAL SPINE FLUOROSCOPIC IMAGE INTERPRETATION:    EXAMINATION: AP, lateral, and oblique views. FLUORO TIME: 22 seconds    DISCUSSION:  Spot views of the spine reveal normal alignment and segmentation. Spinal needles are positioned at the base of the SAP at the junction with the transverse process/sacral ala  OR center of  the articular pillars on PA and lateral views. Contrast at needle tip confirms satisfactory placement. Visualized spine reveals right See radiology report. Soft tissues reveal no abnormalities. IMPRESSION:  Satisfactory needle placement and contrast dispersal for right L4-55 -S1 medial branch block.      Electronically signed by Avi Vela MD on 3/3/2022 at 10:29 AM

## 2022-03-17 ENCOUNTER — PRE-PROCEDURE TELEPHONE (OUTPATIENT)
Dept: PREADMISSION TESTING | Age: 57
End: 2022-03-17

## 2022-03-17 NOTE — TELEPHONE ENCOUNTER
Spoke with patient and confirmed a covid swab appt for March 23rd at [address] [place] [address]. Instructions provided, verbalizes understanding.

## 2022-03-23 ENCOUNTER — HOSPITAL ENCOUNTER (OUTPATIENT)
Dept: PREADMISSION TESTING | Age: 57
Setting detail: SPECIMEN
Discharge: HOME | End: 2022-03-27
Payer: MEDICARE

## 2022-03-23 DIAGNOSIS — Z11.59 ENCOUNTER FOR SCREENING FOR OTHER VIRAL DISEASES: Primary | ICD-10-CM

## 2022-03-23 PROCEDURE — U0003 INFECTIOUS AGENT DETECTION BY NUCLEIC ACID (DNA OR RNA); SEVERE ACUTE RESPIRATORY SYNDROME CORONAVIRUS 2 (SARS-COV-2) (CORONAVIRUS DISEASE [COVID-19]), AMPLIFIED PROBE TECHNIQUE, MAKING USE OF HIGH THROUGHPUT TECHNOLOGIES AS DESCRIBED BY CMS-2020-01-R: HCPCS

## 2022-03-23 PROCEDURE — U0005 INFEC AGEN DETEC AMPLI PROBE: HCPCS

## 2022-03-24 LAB
SARS-COV-2: NORMAL
SARS-COV-2: NOT DETECTED
SOURCE: NORMAL

## 2022-03-28 ENCOUNTER — ANESTHESIA EVENT (OUTPATIENT)
Dept: OPERATING ROOM | Age: 57
End: 2022-03-28
Payer: MEDICARE

## 2022-03-28 ENCOUNTER — HOSPITAL ENCOUNTER (OUTPATIENT)
Age: 57
Setting detail: OUTPATIENT SURGERY
Discharge: HOME | End: 2022-03-28
Attending: PHYSICAL MEDICINE & REHABILITATION | Admitting: PHYSICAL MEDICINE & REHABILITATION
Payer: MEDICARE

## 2022-03-28 ENCOUNTER — APPOINTMENT (OUTPATIENT)
Dept: GENERAL RADIOLOGY | Age: 57
End: 2022-03-28
Attending: PHYSICAL MEDICINE & REHABILITATION
Payer: MEDICARE

## 2022-03-28 ENCOUNTER — ANESTHESIA (OUTPATIENT)
Dept: OPERATING ROOM | Age: 57
End: 2022-03-28
Payer: MEDICARE

## 2022-03-28 VITALS
DIASTOLIC BLOOD PRESSURE: 81 MMHG | RESPIRATION RATE: 9 BRPM | OXYGEN SATURATION: 99 % | SYSTOLIC BLOOD PRESSURE: 146 MMHG

## 2022-03-28 VITALS
HEART RATE: 75 BPM | OXYGEN SATURATION: 100 % | HEIGHT: 68 IN | TEMPERATURE: 97.6 F | BODY MASS INDEX: 28.64 KG/M2 | RESPIRATION RATE: 18 BRPM | DIASTOLIC BLOOD PRESSURE: 85 MMHG | WEIGHT: 189 LBS | SYSTOLIC BLOOD PRESSURE: 150 MMHG

## 2022-03-28 LAB — GLUCOSE BLD-MCNC: 239 MG/DL (ref 75–110)

## 2022-03-28 PROCEDURE — 3600000057 HC PAIN LEVEL 4 ADDL 15 MIN: Performed by: PHYSICAL MEDICINE & REHABILITATION

## 2022-03-28 PROCEDURE — 64636 DESTROY L/S FACET JNT ADDL: CPT | Performed by: PHYSICAL MEDICINE & REHABILITATION

## 2022-03-28 PROCEDURE — 2500000003 HC RX 250 WO HCPCS: Performed by: PHYSICAL MEDICINE & REHABILITATION

## 2022-03-28 PROCEDURE — 2709999900 HC NON-CHARGEABLE SUPPLY: Performed by: PHYSICAL MEDICINE & REHABILITATION

## 2022-03-28 PROCEDURE — 7100000010 HC PHASE II RECOVERY - FIRST 15 MIN: Performed by: PHYSICAL MEDICINE & REHABILITATION

## 2022-03-28 PROCEDURE — 82947 ASSAY GLUCOSE BLOOD QUANT: CPT

## 2022-03-28 PROCEDURE — 3700000000 HC ANESTHESIA ATTENDED CARE: Performed by: PHYSICAL MEDICINE & REHABILITATION

## 2022-03-28 PROCEDURE — 2500000003 HC RX 250 WO HCPCS: Performed by: NURSE ANESTHETIST, CERTIFIED REGISTERED

## 2022-03-28 PROCEDURE — 64635 DESTROY LUMB/SAC FACET JNT: CPT | Performed by: PHYSICAL MEDICINE & REHABILITATION

## 2022-03-28 PROCEDURE — 6360000002 HC RX W HCPCS: Performed by: NURSE ANESTHETIST, CERTIFIED REGISTERED

## 2022-03-28 PROCEDURE — 3600000056 HC PAIN LEVEL 4 BASE: Performed by: PHYSICAL MEDICINE & REHABILITATION

## 2022-03-28 PROCEDURE — 6360000002 HC RX W HCPCS: Performed by: PHYSICAL MEDICINE & REHABILITATION

## 2022-03-28 PROCEDURE — 2580000003 HC RX 258: Performed by: PHYSICAL MEDICINE & REHABILITATION

## 2022-03-28 PROCEDURE — 7100000011 HC PHASE II RECOVERY - ADDTL 15 MIN: Performed by: PHYSICAL MEDICINE & REHABILITATION

## 2022-03-28 PROCEDURE — 3700000001 HC ADD 15 MINUTES (ANESTHESIA): Performed by: PHYSICAL MEDICINE & REHABILITATION

## 2022-03-28 PROCEDURE — 3209999900 FLUORO FOR SURGICAL PROCEDURES

## 2022-03-28 RX ORDER — LIDOCAINE HYDROCHLORIDE 20 MG/ML
INJECTION, SOLUTION INFILTRATION; PERINEURAL PRN
Status: DISCONTINUED | OUTPATIENT
Start: 2022-03-28 | End: 2022-03-28 | Stop reason: SDUPTHER

## 2022-03-28 RX ORDER — SODIUM CHLORIDE, SODIUM LACTATE, POTASSIUM CHLORIDE, CALCIUM CHLORIDE 600; 310; 30; 20 MG/100ML; MG/100ML; MG/100ML; MG/100ML
INJECTION, SOLUTION INTRAVENOUS CONTINUOUS
Status: DISCONTINUED | OUTPATIENT
Start: 2022-03-28 | End: 2022-03-28 | Stop reason: HOSPADM

## 2022-03-28 RX ORDER — SODIUM CHLORIDE 9 MG/ML
25 INJECTION, SOLUTION INTRAVENOUS PRN
Status: DISCONTINUED | OUTPATIENT
Start: 2022-03-28 | End: 2022-03-28 | Stop reason: HOSPADM

## 2022-03-28 RX ORDER — BUPIVACAINE HYDROCHLORIDE 5 MG/ML
INJECTION, SOLUTION EPIDURAL; INTRACAUDAL PRN
Status: DISCONTINUED | OUTPATIENT
Start: 2022-03-28 | End: 2022-03-28 | Stop reason: ALTCHOICE

## 2022-03-28 RX ORDER — LIDOCAINE HYDROCHLORIDE 40 MG/ML
INJECTION, SOLUTION RETROBULBAR; TOPICAL PRN
Status: DISCONTINUED | OUTPATIENT
Start: 2022-03-28 | End: 2022-03-28 | Stop reason: ALTCHOICE

## 2022-03-28 RX ORDER — PROPOFOL 10 MG/ML
INJECTION, EMULSION INTRAVENOUS PRN
Status: DISCONTINUED | OUTPATIENT
Start: 2022-03-28 | End: 2022-03-28 | Stop reason: SDUPTHER

## 2022-03-28 RX ORDER — DEXAMETHASONE SODIUM PHOSPHATE 10 MG/ML
INJECTION INTRAMUSCULAR; INTRAVENOUS PRN
Status: DISCONTINUED | OUTPATIENT
Start: 2022-03-28 | End: 2022-03-28 | Stop reason: ALTCHOICE

## 2022-03-28 RX ORDER — SODIUM CHLORIDE 0.9 % (FLUSH) 0.9 %
10 SYRINGE (ML) INJECTION PRN
Status: DISCONTINUED | OUTPATIENT
Start: 2022-03-28 | End: 2022-03-28 | Stop reason: HOSPADM

## 2022-03-28 RX ORDER — SODIUM CHLORIDE 0.9 % (FLUSH) 0.9 %
10 SYRINGE (ML) INJECTION EVERY 12 HOURS SCHEDULED
Status: DISCONTINUED | OUTPATIENT
Start: 2022-03-28 | End: 2022-03-28 | Stop reason: HOSPADM

## 2022-03-28 RX ADMIN — PROPOFOL 50 MG: 10 INJECTION, EMULSION INTRAVENOUS at 09:39

## 2022-03-28 RX ADMIN — PROPOFOL 100 MG: 10 INJECTION, EMULSION INTRAVENOUS at 09:27

## 2022-03-28 RX ADMIN — PROPOFOL 100 MG: 10 INJECTION, EMULSION INTRAVENOUS at 09:34

## 2022-03-28 RX ADMIN — LIDOCAINE HYDROCHLORIDE 100 MG: 20 INJECTION, SOLUTION EPIDURAL; INFILTRATION; INTRACAUDAL; PERINEURAL at 09:27

## 2022-03-28 RX ADMIN — SODIUM CHLORIDE, POTASSIUM CHLORIDE, SODIUM LACTATE AND CALCIUM CHLORIDE: 600; 310; 30; 20 INJECTION, SOLUTION INTRAVENOUS at 08:29

## 2022-03-28 ASSESSMENT — PULMONARY FUNCTION TESTS
PIF_VALUE: 1

## 2022-03-28 ASSESSMENT — ENCOUNTER SYMPTOMS
BACK PAIN: 1
NAUSEA: 0
RESPIRATORY NEGATIVE: 1
EYES NEGATIVE: 1
ALLERGIC/IMMUNOLOGIC NEGATIVE: 1
CONSTIPATION: 0

## 2022-03-28 ASSESSMENT — PAIN - FUNCTIONAL ASSESSMENT: PAIN_FUNCTIONAL_ASSESSMENT: 0-10

## 2022-03-28 ASSESSMENT — PAIN SCALES - GENERAL
PAINLEVEL_OUTOF10: 0
PAINLEVEL_OUTOF10: 0

## 2022-03-28 NOTE — INTERVAL H&P NOTE
I have interviewed and examined the patient and reviewed the recent History and Physical.  There have been no changes to the recent H&P documentation. The surgical consent form has been signed. Last anticoagulant medication use was:na    Premedication taken for contrast allergy? No    Valium taken for oral sedation? No    No outpatient medications have been marked as taking for the 3/28/22 encounter [facility] Encounter). The patient understands the planned operation and its associated risks and benefits and agrees to proceed.         Electronically signed by Aaron Plata MD on 3/28/2022 at 9:25 AM

## 2022-03-28 NOTE — ANESTHESIA PRE PROCEDURE
Department of Anesthesiology  Preprocedure Note       Name:  Justin Benoit   Age:  61 y.o.  :  1965                                          MRN:  [id number]         Date:  3/28/2022      Surgeon: Radha Arevalo):  Avi Vela MD    Procedure: Procedure(s):  nv-Right L4-L5 L5-S1 RADIOFREQUENCY Ablation    Medications prior to admission:   Prior to Admission medications    Medication Sig Start Date End Date Taking?  Authorizing Provider   ibuprofen (ADVIL;MOTRIN) 800 MG tablet Take 1 tablet by mouth every 6 hours as needed for Pain 22   Monda Dryer, APRN - CNP   metoprolol succinate (TOPROL XL) 25 MG extended release tablet Take 1 tablet by mouth 2 times daily 21   Historical Provider, MD   ASPIRIN 81 PO Take 1 tablet by mouth daily    Historical Provider, MD   atorvastatin (LIPITOR) 80 MG tablet Take 80 mg by mouth daily    Historical Provider, MD   furosemide (LASIX) 20 MG tablet Take 20 mg by mouth 2 times daily    Historical Provider, MD   insulin aspart protamine-insulin aspart (NOVOLOG 70/30 FLEXPEN RELION) (70-30) 100 UNIT/ML injection Inject 8 Units into the skin 3 times daily     Historical Provider, MD   insulin glargine (LANTUS) 100 UNIT/ML injection vial Inject 40 Units into the skin daily     Historical Provider, MD   isosorbide mononitrate (IMDUR) 60 MG extended release tablet Take 60 mg by mouth daily    Historical Provider, MD   magnesium oxide (MAG-OX) 400 MG tablet Take 400 mg by mouth daily    Historical Provider, MD   pantoprazole sodium (PROTONIX) 40 MG PACK packet Take 40 mg by mouth every morning (before breakfast)    Historical Provider, MD       Current medications:    Current Facility-Administered Medications   Medication Dose Route Frequency Provider Last Rate Last Admin    lactated ringers infusion   IntraVENous Continuous Avi Vela  mL/hr at 22 0829 New Bag at 22 08       Allergies:  No Known Allergies    Problem List:    Patient Active Problem List   Diagnosis Code    Lumbar radiculitis M54.16    Lumbar spondylolysis M43.06    DDD (degenerative disc disease), lumbar M51.36    Lumbar facet joint syndrome M47.816    Panniculitis involving lumbar region M54.06       Past Medical History:        Diagnosis Date    Anxiety due to invasive procedure     [facility]onary artery disease)     [facility]    [facility]     DDD (degenerative disc disease), lumbar     DM (diabetes mellitus), type 1 with hyperosmolarity ([facility]     HTN (hypertension)     Hyperlipidemia     Ischemic cardiomyopathy     Lumbar radiculopathy     Sciatica        Past Surgical History:        Procedure Laterality Date    CARDIAC SURGERY      S/P Triple Bypass     CARPAL TUNNEL RELEASE      CORONARY ANGIOPLASTY WITH STENT PLACEMENT      NERVE BLOCK Right 2/11/2022    Right L4-L5 L5-S1 Diagnostic Medial Branch Block performed by Loree Aguilar MD at [address] [place] [address] Right 3/3/2022    Right L4-L5 L5-S1 Confirmatory Medial Branch Block performed by Loree Aguilar MD at [address] [place] [address] Right 1/7/2022    Right L4 L5 TRANSFORAMINAL performed by Loree Aguilar MD at [address] [place] [address] Right 1/28/2022    Right L4 L5  TRANSFORAMINAL performed by Loree Aguilar MD at [facility] OR       Social History:    Social History     Tobacco Use    Smoking status: Current Every Day Smoker     Packs/day: 1.00     Years: 38.00     Pack years: 38.00     Types: Cigarettes    Smokeless tobacco: Never Used   Substance Use Topics    Alcohol use: Not Currently     Comment: Montly or Less                                Ready to quit: Not Answered  Counseling given: Not Answered      Vital Signs (Current):   Vitals:    03/28/22 0817   BP: 130/75   Pulse: 82   Resp: 14   Temp: 36.1 °C (96.9 °F)   TempSrc: Temporal   SpO2: 97%   Weight: 189 lb (85.7 kg)   Height: 5' 8\" (1.727 m)                                              BP Readings from Last 3 Encounters:   03/28/22 130/75   03/03/22 (!) 156/75   02/24/22 (!) 169/92       NPO Status: Time of last liquid consumption: 2130                        Time of last solid consumption: 1800                        Date of last liquid consumption: 03/27/22                        Date of last solid food consumption: 03/27/22    BMI:   Wt Readings from Last 3 Encounters:   03/28/22 189 lb (85.7 kg)   03/03/22 190 lb (86.2 kg)   02/24/22 190 lb (86.2 kg)     Body mass index is 28.74 kg/m².     CBC:   Lab Results   Component Value Date    WBC 7.8 02/24/2022    RBC 4.54 02/24/2022    HGB 14.8 02/24/2022    HCT 42.3 02/24/2022    MCV 93.2 02/24/2022    RDW 12.6 02/24/2022     02/24/2022       CMP:   Lab Results   Component Value Date     02/24/2022    K 3.9 02/24/2022     02/24/2022    CO2 26 02/24/2022    BUN 15 02/24/2022    CREATININE 0.49 02/24/2022    GFRAA >60 02/24/2022    LABGLOM >60 02/24/2022    GLUCOSE 136 02/24/2022    PROT 6.2 02/24/2022    CALCIUM 9.6 02/24/2022    BILITOT 0.49 02/24/2022    ALKPHOS 84 02/24/2022    AST 12 02/24/2022    ALT 14 02/24/2022       POC Tests:   Recent Labs     03/28/22  0823   POCGLU 239*       Coags: No results found for: PROTIME, INR, APTT    HCG (If Applicable): No results found for: PREGTESTUR, PREGSERUM, HCG, HCGQUANT     ABGs: No results found for: PHART, PO2ART, SKN3LHD, AON8DGD, BEART, V2SZKYWK     Type & Screen (If Applicable):  No results found for: LABABO, LABRH    Drug/Infectious Status (If Applicable):  No results found for: HIV, HEPCAB    COVID-19 Screening (If Applicable):   Lab Results   Component Value Date    COVID19 Not Detected 03/23/2022           Anesthesia Evaluation  Patient summary reviewed and Nursing notes reviewed no history of anesthetic complications:   Airway: Mallampati: II  TM distance: >3 FB   Neck ROM: full  Mouth opening: > = 3 FB Dental: normal exam         Pulmonary:Negative Pulmonary ROS and normal exam  breath sounds clear to auscultation            Patient did not smoke on day of surgery. Cardiovascular:    (+) hypertension:, CAD:, CHF:,         Rhythm: regular  Rate: normal           Beta Blocker:  Dose within 24 Hrs         Neuro/Psych:   (+) neuromuscular disease:,             GI/Hepatic/Renal: Neg GI/Hepatic/Renal ROS            Endo/Other:    (+) DiabetesType II DM, , : arthritis: OA., . Pt had no PAT visit       Abdominal:       Abdomen: soft. Vascular: negative vascular ROS. Other Findings:             Anesthesia Plan      MAC     ASA 3       Induction: intravenous. MIPS: Postoperative opioids intended and Prophylactic antiemetics administered. Anesthetic plan and risks discussed with patient.       Plan discussed with surgical team.                  Zack Beaver, SUDARSHAN - CRNA   3/28/2022

## 2022-03-28 NOTE — H&P
RADIOFREQUENCY ABLATION OPERATIVE REPORT    3/28/22  The patient was counseled at length about the risks of evelio Covid-19 during their perioperative period and any recovery window from their procedure. The patient was made aware that evelio Covid-19  may worsen their prognosis for recovering from their procedure  and lend to a higher morbidity and/or mortality risk. All material risks, benefits, and reasonable alternatives including postponing the procedure were discussed. The patient does wish to proceed with the procedure at this time. Surgeon: Trinidad Plunkett MD    Pre-operative Diagnosis:   Hospital Problems           Last Modified POA    * (Principal) Panniculitis involving lumbar region 3/28/2022 Yes    Lumbar facet joint syndrome 3/28/2022 Yes          Post-operative Diagnosis: Same    INDICATION:  Right L4-5 5-S1 radiofrequency neurotomy procedure is requested for therapeutic reasons. Please see H&P for details on previous treatments, examination findings, and work up. right L4-5 5-S1 medial branch blocks are requested for diagnostic reasons. Conservative treatment was ineffective i.e.: ice, NSAIDS, rest,     Patient is unable to perform the following ADL's: ambulating, grooming, sitting and standing     Pain Assessment: 0-10  Pain Level: 8     Pain Orientation: Lower  Pain Location: Back       Last Plain films: 2020    EXAMINATION:  Right L4-5 -5S1 radiofrequency neurotomies. CONSENT:  Written consent was obtained from the patient on the preprinted consent form after explaining the procedure, indications, potential complications and outcomes. Alternative treatments were also discussed. DISCUSSION:  The patient was sterilely prepped and draped in the usual fashion in the prone position. Time out was verified for the correct patient, side, level and procedure.     SEDATION: per CRNA      FACET RF  Under image-intensifier control, a 13887  mm RadionEmb[employer] RFK insulated radiofrequency probe with 5 mm active tips were successfully directed at the Right L4-5 5-S1 medial branches of the dorsal rami at the target points described by SILVANA. Needle tip positions were confirmed in 3 views and sensory and motor test stimulation was performed. The patient reported sensory stimulation at 0.0 to 0.0 volts at 50 Hz and motor stimulation at 0.0 to 0.9 volts at 2 Hz, which confirmed satisfactory sensory motor dissociation. 1 ml of 2% lidocaine was instilled  at each site prior to lesioning. 3 lesions were performed at each level  BY SAGITTAL APPROACH at a temperature of 80 degrees Celsius for a duration of 90 seconds, as described by SILVANA. Impedance was measured and ranged from 202-323 ohms. Then, 0.5mL of  0.5% bupivacaine was instilled at each site after lesioning. The patient tolerated the procedure(s) well and without complications and was noted to be in stable condition prior to discharge from procedure center with discharge instructions. EBL: no blood loss    SPECIMEN: none    IMPRESSION:    1. Right L4-5 5-S1 radiofrequency neurotomies performed uneventfully. RECOMMENDATIONS:  1. Follow up in the [address] in 2 to 4 weeks  2. Continue Neurontin and opioid analgesia, as per protocol. 3.    Complete and return the \"Post-Procedure Pain and Activity Diary. 4.    Contact the [address] for symptom exacerbation, fever or unusual symptoms. 5.    Follow post-procedure care according to verbal and written instructions. POST-PROCEDURE LUMBAR SPINE FLUOROSCOPIC IMAGE INTERPRETATION:    EXAMINATION:  AP, lateral and oblique views of the lumbar spine    FLUORO TIME: 21 seconds    DISCUSSION:  Spot views of the spine reveal normal alignment and segmentation. Spinal needles are positioned at the base of the SAP and across the pedicle on AP and lateral views. #3:  across the ventral and middle third of the articular pillar.  Visualized spine reveals See radiology report. Soft tissues reveal no abnormalities. IMPRESSION: Satisfactory probe placement for RF (radiofrequency) lesioning of the Right L4-5 5-S1 medial branches.     Electronically signed by Danyell Raza MD on 3/28/2022 at 9:26 AM

## 2022-03-28 NOTE — H&P
Subjective:       Patient is here today for a diagnostic/therapeutic injection. 3/3/22    Active Hospital Problems    Diagnosis Date Noted    Panniculitis involving lumbar region [M54.06] 02/03/2022    Lumbar facet joint syndrome [M47.816] 02/03/2022       HPI: Patient is here today for adiagnostic/therapeutic injection. The most recent [facility] office visit notes have been reviewed and are unchanged. Review of Systems   Constitutional: Positive for fatigue. HENT: Negative. Eyes: Negative. Respiratory: Negative. Cardiovascular: Negative. Gastrointestinal: Negative for constipation and nausea. Endocrine: Negative. Genitourinary: Negative for difficulty urinating. Musculoskeletal: Positive for arthralgias, back pain and myalgias. Skin: Negative. Allergic/Immunologic: Negative. Neurological: Positive for weakness and numbness. Hematological: Negative. Psychiatric/Behavioral: Positive for sleep disturbance. All other systems reviewed and are negative. No Known Allergies       Prior to Admission medications    Medication Sig Start Date End Date Taking?  Authorizing Provider   ibuprofen (ADVIL;MOTRIN) 800 MG tablet Take 1 tablet by mouth every 6 hours as needed for Pain 1/20/22   SUDARSHAN Gillis - CNP   metoprolol succinate (TOPROL XL) 25 MG extended release tablet Take 1 tablet by mouth 2 times daily 8/27/21   Historical Provider, MD   ASPIRIN 81 PO Take 1 tablet by mouth daily    Historical Provider, MD   atorvastatin (LIPITOR) 80 MG tablet Take 80 mg by mouth daily    Historical Provider, MD   furosemide (LASIX) 20 MG tablet Take 20 mg by mouth 2 times daily    Historical Provider, MD   insulin aspart protamine-insulin aspart (NOVOLOG 70/30 FLEXPEN RELION) (70-30) 100 UNIT/ML injection Inject 8 Units into the skin 3 times daily     Historical Provider, MD   insulin glargine (LANTUS) 100 UNIT/ML injection vial Inject 40 Units into the skin daily     Historical Provider, MD   isosorbide mononitrate (IMDUR) 60 MG extended release tablet Take 60 mg by mouth daily    Historical Provider, MD   magnesium oxide (MAG-OX) 400 MG tablet Take 400 mg by mouth daily    Historical Provider, MD   pantoprazole sodium (PROTONIX) 40 MG PACK packet Take 40 mg by mouth every morning (before breakfast)    Historical Provider, MD       Past Medical History:   Diagnosis Date    Anxiety due to invasive procedure     [facility]ary [facility] disease)     [facility]    [facility]     DDD (degenerative disc disease), lumbar     DM (diabetes mellitus), type 1 with hyperosmolarity ([facility]     HTN (hypertension)     Hyperlipidemia     Ischemic cardiomyopathy     Lumbar radiculopathy     Sciatica        Past Surgical History:   Procedure Laterality Date    CARDIAC SURGERY      S/P Triple Bypass     CARPAL TUNNEL RELEASE      CORONARY ANGIOPLASTY WITH STENT PLACEMENT      NERVE BLOCK Right 2/11/2022    Right L4-L5 L5-S1 Diagnostic Medial Branch Block performed by Marie Milan MD at [address] [place], [place] [address] Right 3/3/2022    Right L4-L5 L5-S1 Confirmatory Medial Branch Block performed by Marie Milan MD at [address] [place] [address] Right 1/7/2022    Right L4 L5 TRANSFORAMINAL performed by Marie Milan MD at [address] [place] [address] Right 1/28/2022    Right L4 L5  TRANSFORAMINAL performed by Marie Milan MD at [place] [address] andSocial History reviewed in the electronic medical record. Imaging: Reviewed available imaging in oursystem with the patient. No results found. Objective:     Vitals:    03/28/22 0817   BP: 130/75   Pulse: 82   Resp: 14   Temp: 96.9 °F (36.1 °C)   SpO2: 97%          Physical Exam  Constitutional:       Appearance: He is well-developed. HENT:      Head: Normocephalic and atraumatic. Cardiovascular:      Pulses: Normal pulses. Comments: Warm extremities.  Normal capillary refill. Pulmonary:      Effort: Pulmonary effort is normal.   Abdominal:      General: Abdomen is flat. Palpations: Abdomen is soft. Musculoskeletal:      Lumbar back: Negative right straight leg raise test and negative left straight leg raise test.   Skin:     General: Skin is warm and dry. Neurological:      General: No focal deficit present. Mental Status: He is alert and oriented to person, place, and time. Cranial Nerves: No cranial nerve deficit. Sensory: No sensory deficit. Motor: No atrophy or abnormal muscle tone. Deep Tendon Reflexes: Reflexes are normal and symmetric. Psychiatric:         Mood and Affect: Mood normal.         Speech: Speech normal.         Behavior: Behavior normal.       Neurologic Exam     Mental Status   Oriented to person, place, and time. Speech: speech is normal     Motor Exam     Strength   Right quadriceps: 5/5  Left quadriceps: 5/5  Right hamstrin/5  Left hamstrin/5    Back Exam     Tenderness   The patient is experiencing tenderness in the lumbar (+kemps Right ).     Range of Motion   Extension: normal   Flexion: normal   Lateral bend right: normal   Lateral bend left: normal   Rotation right: normal   Rotation left: normal     Muscle Strength   Right Quadriceps:  5/5   Left Quadriceps:  5/5   Right Hamstrings:  5/5   Left Hamstrings:  5/5     Tests   Straight leg raise right: negative  Straight leg raise left: negative    Other   Toe walk: normal  Heel walk: normal  Sensation: normal  Gait: normal             Lab Results   Component Value Date    WBC 7.8 2022    HGB 14.8 2022    HCT 42.3 2022     2022    ALT 14 2022    AST 12 2022     2022    K 3.9 2022     2022    CREATININE 0.49 (L) 2022    BUN 15 2022    CO2 26 2022       Assessment:                            Active Hospital Problems    Diagnosis Date Noted    Panniculitis involving lumbar region [M54.06] 02/03/2022    Lumbar facet joint syndrome [M47.816] 02/03/2022                  Plan:   Proceed with planned procedure  - Right L4-5 5-S1 Confirmatory MBB    The patientunderstands the planned operation and its associated risks and benefits and agrees to proceed. The surgical consent form has been signed. Last NSAID/anticoagulant medication use was:na    Premedication taken for contrast allergy? No    Valium taken for oral sedation?  No

## 2022-03-30 NOTE — OP NOTE
[facility] OPERATIVE REPORT    3/28/22    Surgeon: Aaron Plata MD  The patient was counseled at length about the risks of evelio Covid-19 during their perioperative period and any recovery window from their procedure. The patient was made aware that evelio Covid-19  may worsen their prognosis for recovering from their procedure  and lend to a higher morbidity and/or mortality risk. All material risks, benefits, and reasonable alternatives including postponing the procedure were discussed. The patient does wish to proceed with the procedure at this time. Pre-operative Diagnosis:   Hospital Problems           Last Modified POA    * (Principal) Panniculitis involving lumbar region 3/28/2022 Yes    Lumbar facet joint syndrome 3/28/2022 Yes          Post-operative Diagnosis: Same    INDICATION:  Right L4-5 5-S1 radiofrequency neurotomy procedure is requested for therapeutic reasons. Please see H&P for details on previous treatments, examination findings, and work up. right L4-5 5L5-S1 medial branch blocks are requested for diagnostic reasons. Conservative treatment was ineffective i.e.: ice, NSAIDS, rest,     Patient is unable to perform the following ADL's: ambulating, grooming, sitting and standing     Pain Assessment: 0-10  Pain Level: 8     Pain Orientation: Lower  Pain Location: Back       Last Plain films: 2020    EXAMINATION:  Right L4-55 S1 radiofrequency neurotomies. CONSENT:  Written consent was obtained from the patient on the preprinted consent form after explaining the procedure, indications, potential complications and outcomes. Alternative treatments were also discussed. DISCUSSION:  The patient was sterilely prepped and draped in the usual fashion in the prone position. Time out was verified for the correct patient, side, level and procedure.     SEDATION: jper anesthesia     FACET RF  Under image-intensifier control, a 04292  mm [employer] RFK insulated radiofrequency probe with 5 mm active tips were successfully directed at the Right R L4-5 5-S1 medial branches of the dorsal rami at the target points described by SILVANA. Needle tip positions were confirmed in 3 views and sensory and motor test stimulation was performed. The patient reported sensory stimulation at 0.0 to 0.0 volts at 50 Hz and motor stimulation at 0.0 to 0.0 volts at 2 Hz, which confirmed satisfactory sensory motor dissociation. 1 ml of 2% lidocaine was instilled  at each site prior to lesioning. 2 lesions were performed at each level  BY SAGITTAL APPROACH at a temperature of 80 degrees Celsius for a duration of 90 seconds, as described by SILVANA. Impedance was measured and ranged from 202-322 ohms. Then, 0.5mL of  0.5% bupivacaine was instilled at each site after lesioning. The patient tolerated the procedure(s) well and without complications and was noted to be in stable condition prior to discharge from procedure center with discharge instructions. EBL: no blood loss    SPECIMEN: none    IMPRESSION:    1. Right L4-5 5S1 radiofrequency neurotomies performed uneventfully. RECOMMENDATIONS:  1. Follow up in the [address] in 2 to 4 weeks  2. Continue Neurontin and opioid analgesia, as per protocol. 3.    Complete and return the \"Post-Procedure Pain and Activity Diary. 4.    Contact the [address] for symptom exacerbation, fever or unusual symptoms. 5.    Follow post-procedure care according to verbal and written instructions. POST-PROCEDURE LUMBAR SPINE FLUOROSCOPIC IMAGE INTERPRETATION:    EXAMINATION:  AP, lateral and oblique views of the lumbar spine    FLUORO TIME: 21 seconds    DISCUSSION:  Spot views of the spine reveal normal alignment and segmentation. Spinal needles are positioned at the base of the SAP and across the pedicle on AP and lateral views. #3:  across the ventral and middle third of the articular pillar.  Visualized spine reveals See radiology report. Soft tissues reveal no abnormalities. IMPRESSION: Satisfactory probe placement for RF (radiofrequency) lesioning of the Right L4-5 5-S1 medial branches.     Electronically signed by Eboni Hernandez MD on 3/30/2022 at 9:02 AM

## 2022-05-09 ENCOUNTER — TELEPHONE (OUTPATIENT)
Dept: PAIN MANAGEMENT | Age: 57
End: 2022-05-09

## 2022-05-09 ENCOUNTER — OFFICE VISIT (OUTPATIENT)
Dept: PAIN MANAGEMENT | Age: 57
End: 2022-05-09
Payer: MEDICARE

## 2022-05-09 VITALS
DIASTOLIC BLOOD PRESSURE: 76 MMHG | OXYGEN SATURATION: 96 % | BODY MASS INDEX: 28.87 KG/M2 | WEIGHT: 190.5 LBS | HEART RATE: 83 BPM | RESPIRATION RATE: 17 BRPM | SYSTOLIC BLOOD PRESSURE: 130 MMHG | HEIGHT: 68 IN

## 2022-05-09 DIAGNOSIS — M51.36 DDD (DEGENERATIVE DISC DISEASE), LUMBAR: ICD-10-CM

## 2022-05-09 DIAGNOSIS — M47.816 LUMBAR FACET JOINT SYNDROME: ICD-10-CM

## 2022-05-09 DIAGNOSIS — M54.16 LUMBAR RADICULITIS: Primary | ICD-10-CM

## 2022-05-09 DIAGNOSIS — M43.06 LUMBAR SPONDYLOLYSIS: ICD-10-CM

## 2022-05-09 PROCEDURE — 4004F PT TOBACCO SCREEN RCVD TLK: CPT | Performed by: NURSE PRACTITIONER

## 2022-05-09 PROCEDURE — 3017F COLORECTAL CA SCREEN DOC REV: CPT | Performed by: NURSE PRACTITIONER

## 2022-05-09 PROCEDURE — 99215 OFFICE O/P EST HI 40 MIN: CPT | Performed by: NURSE PRACTITIONER

## 2022-05-09 PROCEDURE — G8419 CALC BMI OUT NRM PARAM NOF/U: HCPCS | Performed by: NURSE PRACTITIONER

## 2022-05-09 PROCEDURE — 99214 OFFICE O/P EST MOD 30 MIN: CPT | Performed by: NURSE PRACTITIONER

## 2022-05-09 PROCEDURE — G8427 DOCREV CUR MEDS BY ELIG CLIN: HCPCS | Performed by: NURSE PRACTITIONER

## 2022-05-09 RX ORDER — PEN NEEDLE, DIABETIC 31 GX5/16"
NEEDLE, DISPOSABLE MISCELLANEOUS
COMMUNITY
Start: 2022-04-23

## 2022-05-09 RX ORDER — NITROGLYCERIN 0.4 MG/1
TABLET SUBLINGUAL
COMMUNITY
Start: 2022-03-10

## 2022-05-09 RX ORDER — INSULIN ASPART 100 [IU]/ML
INJECTION, SOLUTION INTRAVENOUS; SUBCUTANEOUS
COMMUNITY
Start: 2022-04-27

## 2022-05-09 RX ORDER — PEN NEEDLE, DIABETIC 32GX 5/32"
NEEDLE, DISPOSABLE MISCELLANEOUS
COMMUNITY
Start: 2022-04-19

## 2022-05-09 RX ORDER — METRONIDAZOLE 500 MG/1
TABLET ORAL
COMMUNITY
Start: 2022-04-14

## 2022-05-09 RX ORDER — SUCRALFATE 1 G/1
TABLET ORAL
COMMUNITY
Start: 2022-04-11

## 2022-05-09 RX ORDER — TIZANIDINE 2 MG/1
TABLET ORAL
Qty: 90 TABLET | Refills: 0 | Status: SHIPPED | OUTPATIENT
Start: 2022-05-09

## 2022-05-09 RX ORDER — DICLOFENAC SODIUM 75 MG/1
75 TABLET, DELAYED RELEASE ORAL 2 TIMES DAILY
Qty: 60 TABLET | Refills: 3 | Status: SHIPPED | OUTPATIENT
Start: 2022-05-09

## 2022-05-09 RX ORDER — FLASH GLUCOSE SENSOR
KIT MISCELLANEOUS
COMMUNITY
Start: 2022-04-17

## 2022-05-09 RX ORDER — LISINOPRIL 2.5 MG/1
2.5 TABLET ORAL DAILY
COMMUNITY

## 2022-05-09 RX ORDER — CIPROFLOXACIN 500 MG/1
TABLET, FILM COATED ORAL
COMMUNITY
Start: 2022-04-14 | End: 2022-05-09 | Stop reason: ALTCHOICE

## 2022-05-09 RX ORDER — BLOOD SUGAR DIAGNOSTIC
STRIP MISCELLANEOUS
COMMUNITY
Start: 2022-04-23

## 2022-05-09 RX ORDER — FAMOTIDINE 20 MG/1
TABLET, FILM COATED ORAL
COMMUNITY
Start: 2022-04-11

## 2022-05-09 ASSESSMENT — ENCOUNTER SYMPTOMS
RESPIRATORY NEGATIVE: 1
ABDOMINAL PAIN: 1
BACK PAIN: 1

## 2022-05-09 NOTE — LETTER
[address] Pain Management A department of [place] [address]  Phone: [phone]  Fax: [phone]    SUDARSHAN Do CNP        May 9, 2022     Patient: Elena Izaguirre   YOB: 1965   Date of Visit: 5/9/2022       To Whom It May Concern: It is my medical opinion that Elena Izaguirre work no more than 20 hrs/week with intermittent 30 minute rest breaks during his shift, preferably a semi with a bunk. If you have any questions or concerns, please don't hesitate to call.     Sincerely,        SUDARSHAN Do CNP

## 2022-05-09 NOTE — PROGRESS NOTES
Subjective:      Patient ID: Mitch Colindres is a 61 y.o. male. Chief Complaint   Patient presents with    Back Pain    Abdominal Pain     Back Pain  Associated symptoms include abdominal pain, numbness and weakness. Abdominal Pain  Associated symptoms include arthralgias and myalgias. Failed radiofrequency ablation    Pain Assessment  Location of Pain:  (abdomin)  Location Modifiers: Left,Right,Anterior,Posterior,Superior,Inferior,Medial,Lateral  Severity of Pain: 8  Quality of Pain: Sharp  Duration of Pain: Persistent  Frequency of Pain: Constant  Aggravating Factors:  (everything)  Limiting Behavior: Yes  Relieving Factors:  (nothing)  Result of Injury: No  Work-Related Injury: No  Are there other pain locations you wish to document?: No    Allergies   Allergen Reactions    Adhesive Tape        Outpatient Medications Marked as Taking for the 5/9/22 encounter (Office Visit) with SUDARSHAN Au CNP   Medication Sig Dispense Refill    Continuous Blood Gluc Sensor (FREESTYLE MICHELLE 2 SENSOR) MISC USE 1 SENSOR EVERY 14 DAYS      famotidine (PEPCID) 20 MG tablet TAKE 1 TABLET BY MOUTH AT BEDTIME      ONETOUCH ULTRA strip USE 1 STRIP TO CHECK GLUCOSE ONCE DAILY      NOVOLOG FLEXPEN RELION 100 UNIT/ML injection pen INJECT 6 UNITS SUBCUTANEOUSLY THREE TIMES DAILY      BD PEN NEEDLE SHERRI 2ND GEN 32G X 4 MM MISC USE 1 ONCE DAILY WITH LANTUS      B-D UF III MINI PEN NEEDLES 31G X 5 MM MISC USE 1 4 TIMES DAILY      lisinopril (PRINIVIL;ZESTRIL) 2.5 MG tablet Take 2.5 mg by mouth daily      magnesium oxide (MAG-OX) 400 (241.3 Mg) MG TABS tablet TAKE 1 TABLET BY MOUTH ONCE DAILY      metroNIDAZOLE (FLAGYL) 500 MG tablet TAKE 1 TABLET BY MOUTH THREE TIMES DAILY FOR 10 DAYS      nitroGLYCERIN (NITROSTAT) 0.4 MG SL tablet DISSOLVE ONE TABLET UNDER THE TONGUE EVERY 5 MINUTES AS NEEDED FOR CHEST PAIN. DO NOT EXCEED A TOTAL OF 3 DOSES IN 15 MINUTES. CALL EMS WITH 3RD DOSE.       sucralfate (CARAFATE) 1 GM tablet TAKE 1 TABLET BY MOUTH 4 TIMES DAILY BEFORE MEALS AND BEDTIME FOR EPIGASTRIC PAIN      metoprolol succinate (TOPROL XL) 25 MG extended release tablet Take 1 tablet by mouth 2 times daily      ASPIRIN 81 PO Take 1 tablet by mouth daily      atorvastatin (LIPITOR) 80 MG tablet Take 80 mg by mouth daily      furosemide (LASIX) 20 MG tablet Take 20 mg by mouth 2 times daily      insulin aspart protamine-insulin aspart (NOVOLOG 70/30 FLEXPEN RELION) (70-30) 100 UNIT/ML injection Inject 8 Units into the skin 3 times daily       insulin glargine (LANTUS) 100 UNIT/ML injection vial Inject 40 Units into the skin daily       isosorbide mononitrate (IMDUR) 60 MG extended release tablet Take 60 mg by mouth daily      magnesium oxide (MAG-OX) 400 MG tablet Take 400 mg by mouth daily      pantoprazole sodium (PROTONIX) 40 MG PACK packet Take 40 mg by mouth every morning (before breakfast)         Past Medical History:   Diagnosis Date    Anxiety due to invasive procedure     CAD (coronary artery disease)     [facility]    CHF (congestive heart failure) ([address]     DDD (degenerative disc disease), lumbar     DM (diabetes mellitus), type 1 with hyperosmolarity (HCC)     HTN (hypertension)     Hyperlipidemia     Ischemic cardiomyopathy     Lumbar radiculopathy     Sciatica        Past Surgical History:   Procedure Laterality Date    CARDIAC SURGERY      S/P Triple Bypass     CARPAL TUNNEL RELEASE      CORONARY ANGIOPLASTY WITH STENT PLACEMENT      NERVE BLOCK Right 2/11/2022    Right L4-L5 L5-S1 Diagnostic Medial Branch Block performed by rAic Franklin MD at [address] [place] [address] Right 3/3/2022    Right L4-L5 L5-S1 Confirmatory Medial Branch Block performed by Aric Franklin MD at [address] [place] [address] Right 1/7/2022    Right L4 L5 TRANSFORAMINAL performed by Aric Franklin MD at [address] [place] [address] Right 1/28/2022    Right L4 L5  TRANSFORAMINAL performed by Zeinab Morris Fredis Cole MD at [facility] Right 3/28/2022    Right L4-L5 L5-S1 RADIOFREQUENCY Ablation performed by France Waggoner MD at [facility]       Family History   Problem Relation Age of Onset    Heart Disease Mother     Diabetes Father        Social History     Socioeconomic History    Marital status: Single     Spouse name: None    Number of children: None    Years of education: None    Highest education level: None   Occupational History    None   Tobacco Use    Smoking status: Current Every Day Smoker     Packs/day: 1.00     Years: 38.00     Pack years: 38.00     Types: Cigarettes    Smokeless tobacco: Never Used   Vaping Use    Vaping Use: Never used   Substance and Sexual Activity    Alcohol use: Not Currently     Comment: Montly or Less    Drug use: Never    Sexual activity: None   Other Topics Concern    None   Social History Narrative    None     Social Determinants of Health     Financial Resource Strain:     Difficulty of Paying Living Expenses: Not on file   Food Insecurity:     Worried About Running Out of Food in the Last Year: Not on file    Mike of Food in the Last Year: Not on file   Transportation Needs:     Lack of Transportation (Medical): Not on file    Lack of Transportation (Non-Medical):  Not on file   Physical Activity:     Days of Exercise per Week: Not on file    Minutes of Exercise per Session: Not on file   Stress:     Feeling of Stress : Not on file   Social Connections:     Frequency of Communication with Friends and Family: Not on file    Frequency of Social Gatherings with Friends and Family: Not on file    Attends Taoist Services: Not on file    Active Member of Clubs or Organizations: Not on file    Attends Club or Organization Meetings: Not on file    Marital Status: Not on file   Intimate Partner Violence:     Fear of Current or Ex-Partner: Not on file    Emotionally Abused: Not on file    Physically Abused: Not on file   Jacoby Brody Sexually Abused: Not on file   Housing Stability:     Unable to Pay for Housing in the Last Year: Not on file    Number of Places Lived in the Last Year: Not on file    Unstable Housing in the Last Year: Not on file     Review of Systems   Constitutional: Negative for activity change. Respiratory: Negative. Cardiovascular: Negative. Gastrointestinal: Positive for abdominal pain. Genitourinary: Negative. Musculoskeletal: Positive for arthralgias, back pain and myalgias. Skin: Negative. Neurological: Positive for weakness and numbness. Hematological: Does not bruise/bleed easily. Psychiatric/Behavioral: Positive for sleep disturbance. Objective:   Physical Exam  Vitals reviewed. Constitutional:       General: He is awake. He is not in acute distress. Appearance: Normal appearance. He is well-developed. He is not ill-appearing, toxic-appearing or diaphoretic. HENT:      Head: Normocephalic and atraumatic. Eyes:      General: Lids are normal.   Pulmonary:      Effort: Pulmonary effort is normal. No tachypnea, bradypnea, accessory muscle usage, prolonged expiration, respiratory distress or retractions. Abdominal:      General: Abdomen is flat. Palpations: Abdomen is soft. Musculoskeletal:      Lumbar back: Positive right straight leg raise test.   Skin:     General: Skin is warm and dry. Capillary Refill: Capillary refill takes less than 2 seconds. Coloration: Skin is not ashen, jaundiced or pale. Findings: No rash. Nails: There is no clubbing. Neurological:      Mental Status: He is alert and oriented to person, place, and time. Cranial Nerves: No cranial nerve deficit. Psychiatric:         Attention and Perception: Attention normal.         Mood and Affect: Mood normal.         Speech: Speech normal.         Behavior: Behavior is cooperative.          Cognition and Memory: Cognition normal.         Judgment: Judgment normal. Assessment / Plan:      Diagnosis Orders   1. Lumbar radiculitis     2. Lumbar spondylolysis     3. DDD (degenerative disc disease), lumbar     4. Lumbar facet joint syndrome       Discontinue motrin, start diclofenac  Tizanidine prn  Schedule repeat right L4, L5 transforaminal    Informed consent has not been obtained for procedure. Volanda Denver  on blood thinners. Medications to hold have been reviewed with patient. Blood thinners must be held with permission from your cardiologist or primary care physician. A letter is  required to besent to PCP/Cardiologist regarding holding medications for procedure to decrease bleeding risk.

## 2022-05-09 NOTE — PATIENT INSTRUCTIONS
[facility] [place], [place]  [address]  Telephone [phone]  Fax [phone]      PROCEDURE INSTRUCTIONS FOR  PAIN MANAGEMENT PROCEDURES WITHOUT IV SEDATION    Destini Weiss scheduled to see Dr. Vishal Payne to undergo the following procedure:  Right L4 L5 Transforaminal Epidural Steroid Injection    Procedure Date: ____5/20/22____  You will receive a phone call the day prior to your procedure to confirm a time of arrival.    Report to the [place] [address], Registration office on the 1st floor in the hospital, after check in and signing of paper work you will then go to the second floor to the surgery center. 1. Stop the following medications prior to the procedure:    Aspirin 81 mg   Stop blood thinners as directed before the injection, with permission from your cardiologist or primary care physician. We will send a letter to them requesting permission to hold the blood thinners. · Medication___Aspirin 81 mg ___ held for __5__ days    If you take Warfarin (Coumadin), you must have your blood drawn for an INR the day before the procedure. INR must be less than 1.5. if not complete prior to check in the procedure this will be drawn at the procedure center prior to having the procedure completed. 2.  Take all routine medications unless otherwise instructed. Ok to take vitamins and antiinflammatory medications    3. EATING & DRINKING:  Ok to eat or drink before the injection - no IV sedation will be used. 4. If you are allergic to contrast or iodine, you must take benadryl and prednisone prior to the injection to prevent an allergic reaction. Follow the directions on the prescription for the times to take the medication. 5. Oral valium can be prescribed if your are anxious about the injections or feel that you can not lay still during the injection. If you take valium, you must have a .  Make arrangements for a family member or friend to drive you to the surgery center. Your ride must stay in the hospital while you are having the injection done. If they cannot stay, the injection will be rescheduled. The valium may affect your judgment following the procedure and driving a vehicle within 24 hours after the sedation could be dangerous. 6.  Wear simple loose clothing, which can be easily changed. 7.  Leave jewelry (including rings) and other valuables at home. 8.  You will be asked to sign several forms prior to surgery; patients under the age of 25 must have a parent or legal guardian sign the permit to be able to do the procedure. 9.  You must have finished any antibiotic prescribed for recent infections. If required, please take pre-procedure antibiotic or other pre-procedure medications as instructed. 10. Bring inhalers and pain medications with you to your procedure. 11. Bring your MRI/CT films if they were done outside of the [facility]. 12. If you should develop a cold, sore throat, cough, fever or other new indication of illness or infection, or are started on antibiotics within 2 weeks of the scheduled procedure, please notify the [facility] office as early as possible at [phone]. If calling after 4:30pm the day prior to your scheduled procedure please contact [phone] and Leave a Voice Message.

## 2022-05-09 NOTE — TELEPHONE ENCOUNTER
Right L4 L5 TFE with no sedation scheduled for 5/20/22. Patient educated to hold ASA 81 mg with patient cardiologist notified in recent past of blood thinner hiatus. Surgery center notified.

## 2022-05-13 NOTE — TELEPHONE ENCOUNTER
Patient called refill line for their    Ibuprophen 800 mg 120 Tabs   Send to [facility] in [place]     Last Appt:  5/9/2022  Next Appt:   6/3/2022

## 2022-05-19 RX ORDER — IBUPROFEN 800 MG/1
800 TABLET ORAL EVERY 6 HOURS PRN
Qty: 120 TABLET | Refills: 0 | Status: SHIPPED | OUTPATIENT
Start: 2022-05-19

## 2022-05-19 NOTE — TELEPHONE ENCOUNTER
Script approved and sent to pharmacy, please notify patient    I would also recommend he clear ibuprofen with cardiology or PCP due to medical history

## 2022-05-20 ENCOUNTER — HOSPITAL ENCOUNTER (OUTPATIENT)
Age: 57
Setting detail: OUTPATIENT SURGERY
Discharge: HOME | End: 2022-05-20
Attending: PHYSICAL MEDICINE & REHABILITATION | Admitting: PHYSICAL MEDICINE & REHABILITATION
Payer: MEDICARE

## 2022-05-20 ENCOUNTER — TELEPHONE (OUTPATIENT)
Dept: PAIN MANAGEMENT | Age: 57
End: 2022-05-20

## 2022-05-20 VITALS
RESPIRATION RATE: 16 BRPM | WEIGHT: 190 LBS | BODY MASS INDEX: 28.79 KG/M2 | HEIGHT: 68 IN | HEART RATE: 88 BPM | OXYGEN SATURATION: 98 % | TEMPERATURE: 98.3 F | SYSTOLIC BLOOD PRESSURE: 133 MMHG | DIASTOLIC BLOOD PRESSURE: 72 MMHG

## 2022-05-20 LAB
GLUCOSE BLD-MCNC: 352 MG/DL (ref 75–110)
GLUCOSE BLD-MCNC: 365 MG/DL (ref 75–110)

## 2022-05-20 PROCEDURE — 82947 ASSAY GLUCOSE BLOOD QUANT: CPT

## 2022-05-20 ASSESSMENT — PAIN - FUNCTIONAL ASSESSMENT: PAIN_FUNCTIONAL_ASSESSMENT: 0-10

## 2022-05-20 ASSESSMENT — PAIN DESCRIPTION - DESCRIPTORS: DESCRIPTORS: SHARP;OTHER (COMMENT)

## 2022-05-27 ENCOUNTER — HOSPITAL ENCOUNTER (OUTPATIENT)
Age: 57
Setting detail: OUTPATIENT SURGERY
Discharge: HOME | End: 2022-05-27
Attending: PHYSICAL MEDICINE & REHABILITATION | Admitting: PHYSICAL MEDICINE & REHABILITATION
Payer: MEDICARE

## 2022-05-27 ENCOUNTER — APPOINTMENT (OUTPATIENT)
Dept: GENERAL RADIOLOGY | Age: 57
End: 2022-05-27
Attending: PHYSICAL MEDICINE & REHABILITATION
Payer: MEDICARE

## 2022-05-27 VITALS
DIASTOLIC BLOOD PRESSURE: 83 MMHG | BODY MASS INDEX: 28.79 KG/M2 | TEMPERATURE: 98.2 F | RESPIRATION RATE: 16 BRPM | HEIGHT: 68 IN | WEIGHT: 190 LBS | OXYGEN SATURATION: 98 % | HEART RATE: 78 BPM | SYSTOLIC BLOOD PRESSURE: 143 MMHG

## 2022-05-27 PROBLEM — M47.816 LUMBAR SPONDYLOSIS: Status: ACTIVE | Noted: 2022-05-27

## 2022-05-27 PROCEDURE — 64483 NJX AA&/STRD TFRM EPI L/S 1: CPT | Performed by: PHYSICAL MEDICINE & REHABILITATION

## 2022-05-27 PROCEDURE — 3600000056 HC PAIN LEVEL 4 BASE: Performed by: PHYSICAL MEDICINE & REHABILITATION

## 2022-05-27 PROCEDURE — 6360000004 HC RX CONTRAST MEDICATION: Performed by: PHYSICAL MEDICINE & REHABILITATION

## 2022-05-27 PROCEDURE — 3209999900 FLUORO FOR SURGICAL PROCEDURES

## 2022-05-27 PROCEDURE — 2500000003 HC RX 250 WO HCPCS: Performed by: PHYSICAL MEDICINE & REHABILITATION

## 2022-05-27 PROCEDURE — A4216 STERILE WATER/SALINE, 10 ML: HCPCS | Performed by: PHYSICAL MEDICINE & REHABILITATION

## 2022-05-27 PROCEDURE — 7100000011 HC PHASE II RECOVERY - ADDTL 15 MIN: Performed by: PHYSICAL MEDICINE & REHABILITATION

## 2022-05-27 PROCEDURE — 6360000002 HC RX W HCPCS: Performed by: PHYSICAL MEDICINE & REHABILITATION

## 2022-05-27 PROCEDURE — 7100000010 HC PHASE II RECOVERY - FIRST 15 MIN: Performed by: PHYSICAL MEDICINE & REHABILITATION

## 2022-05-27 PROCEDURE — 2709999900 HC NON-CHARGEABLE SUPPLY: Performed by: PHYSICAL MEDICINE & REHABILITATION

## 2022-05-27 PROCEDURE — 64484 NJX AA&/STRD TFRM EPI L/S EA: CPT | Performed by: PHYSICAL MEDICINE & REHABILITATION

## 2022-05-27 PROCEDURE — 2580000003 HC RX 258: Performed by: PHYSICAL MEDICINE & REHABILITATION

## 2022-05-27 RX ORDER — SODIUM CHLORIDE 0.9 % (FLUSH) 0.9 %
5-40 SYRINGE (ML) INJECTION EVERY 12 HOURS SCHEDULED
Status: CANCELLED | OUTPATIENT
Start: 2022-05-27

## 2022-05-27 RX ORDER — DEXAMETHASONE SODIUM PHOSPHATE 10 MG/ML
INJECTION INTRAMUSCULAR; INTRAVENOUS PRN
Status: DISCONTINUED | OUTPATIENT
Start: 2022-05-27 | End: 2022-05-27 | Stop reason: ALTCHOICE

## 2022-05-27 RX ORDER — SODIUM CHLORIDE 9 MG/ML
INJECTION INTRAVENOUS PRN
Status: DISCONTINUED | OUTPATIENT
Start: 2022-05-27 | End: 2022-05-27 | Stop reason: ALTCHOICE

## 2022-05-27 RX ORDER — SODIUM CHLORIDE 9 MG/ML
INJECTION, SOLUTION INTRAVENOUS PRN
Status: CANCELLED | OUTPATIENT
Start: 2022-05-27

## 2022-05-27 RX ORDER — SODIUM CHLORIDE 0.9 % (FLUSH) 0.9 %
5-40 SYRINGE (ML) INJECTION PRN
Status: CANCELLED | OUTPATIENT
Start: 2022-05-27

## 2022-05-27 RX ORDER — BUPIVACAINE HYDROCHLORIDE 5 MG/ML
INJECTION, SOLUTION EPIDURAL; INTRACAUDAL PRN
Status: DISCONTINUED | OUTPATIENT
Start: 2022-05-27 | End: 2022-05-27 | Stop reason: ALTCHOICE

## 2022-05-27 ASSESSMENT — ENCOUNTER SYMPTOMS
RESPIRATORY NEGATIVE: 1
ABDOMINAL PAIN: 1
BACK PAIN: 1

## 2022-05-27 ASSESSMENT — PAIN DESCRIPTION - LOCATION
LOCATION: BACK
LOCATION: BACK

## 2022-05-27 ASSESSMENT — PAIN DESCRIPTION - PAIN TYPE
TYPE: SURGICAL PAIN
TYPE: SURGICAL PAIN

## 2022-05-27 ASSESSMENT — PAIN DESCRIPTION - DESCRIPTORS
DESCRIPTORS: PRESSURE
DESCRIPTORS: PRESSURE
DESCRIPTORS: ACHING

## 2022-05-27 ASSESSMENT — PAIN SCALES - GENERAL
PAINLEVEL_OUTOF10: 6
PAINLEVEL_OUTOF10: 8

## 2022-05-27 ASSESSMENT — PAIN DESCRIPTION - ORIENTATION
ORIENTATION: LOWER
ORIENTATION: LOWER

## 2022-05-27 ASSESSMENT — PAIN - FUNCTIONAL ASSESSMENT: PAIN_FUNCTIONAL_ASSESSMENT: 0-10

## 2022-05-27 NOTE — INTERVAL H&P NOTE
I have interviewed and examined the patient and reviewed the recent History and Physical.  There have been no changes to the recent H&P documentation. The surgical consent form has been signed. Last anticoagulant medication use was:3-5 days    Premedication taken for contrast allergy? No    Valium taken for oral sedation? No    No outpatient medications have been marked as taking for the 5/27/22 encounter [facility] Encounter). The patient understands the planned operation and its associated risks and benefits and agrees to proceed.         Electronically signed by Afua Jay MD on 5/27/2022 at 11:49 AM

## 2022-05-27 NOTE — H&P
Subjective:      Patient ID: Domenic West is a 61 y.o. male. No chief complaint on file. Back Pain  Associated symptoms include abdominal pain, numbness and weakness. Abdominal Pain  Associated symptoms include arthralgias and myalgias. Failed radiofrequency ablation         Allergies   Allergen Reactions    Adhesive Tape        No outpatient medications have been marked as taking for the 5/27/22 encounter [facility] Encounter).        Past Medical History:   Diagnosis Date    Anxiety due to invasive procedure     CAD (coronary artery disease)     [facility]    CHF (congestive heart failure) (HCC)     DDD (degenerative disc disease), lumbar     DM (diabetes mellitus), type 1 with hyperosmolarity ([facility]     HTN (hypertension)     Hyperlipidemia     Ischemic cardiomyopathy     Lumbar radiculopathy     Sciatica        Past Surgical History:   Procedure Laterality Date    CARDIAC SURGERY      S/P Triple Bypass     CARPAL TUNNEL RELEASE      CORONARY ANGIOPLASTY WITH STENT PLACEMENT      NERVE BLOCK Right 2/11/2022    Right L4-L5 L5-S1 Diagnostic Medial Branch Block performed by Lalito Sims MD at [address] [place] [address] Right 3/3/2022    Right L4-L5 L5-S1 Confirmatory Medial Branch Block performed by Lalito Sims MD at [address] [place] [address] Right 1/7/2022    Right L4 L5 TRANSFORAMINAL performed by Lalito Sims MD at [address] [place] [address] Right 1/28/2022    Right L4 L5  TRANSFORAMINAL performed by Lalito Sims MD at [address] [place] [address] Right 3/28/2022    Right L4-L5 L5-S1 RADIOFREQUENCY Ablation performed by Lalito Sims MD at [facility] OR       Family History   Problem Relation Age of Onset    Heart Disease Mother     Diabetes Father        Social History     Socioeconomic History    Marital status: Single     Spouse name: None    Number of children: None    Years of education: None    Highest education level: None   Occupational History    None   Tobacco Use    Smoking status: Current Every Day Smoker     Packs/day: 1.00     Years: 38.00     Pack years: 38.00     Types: Cigarettes    Smokeless tobacco: Never Used   Vaping Use    Vaping Use: Never used   Substance and Sexual Activity    Alcohol use: Not Currently     Comment: Montly or Less    Drug use: Never    Sexual activity: None   Other Topics Concern    None   Social History Narrative    None     Social Determinants of Health     Financial Resource Strain:     Difficulty of Paying Living Expenses: Not on file   Food Insecurity:     Worried About Running Out of Food in the Last Year: Not on file    Mike of Food in the Last Year: Not on file   Transportation Needs:     Lack of Transportation (Medical): Not on file    Lack of Transportation (Non-Medical): Not on file   Physical Activity:     Days of Exercise per Week: Not on file    Minutes of Exercise per Session: Not on file   Stress:     Feeling of Stress : Not on file   Social Connections:     Frequency of Communication with Friends and Family: Not on file    Frequency of Social Gatherings with Friends and Family: Not on file    Attends Moravian Services: Not on file    Active Member of [address] [place] [address] or Organizations: Not on file    Attends Club or Organization Meetings: Not on file    Marital Status: Not on file   Intimate Partner Violence:     Fear of Current or Ex-Partner: Not on file    Emotionally Abused: Not on file    Physically Abused: Not on file    Sexually Abused: Not on file   Housing Stability:     Unable to Pay for Housing in the Last Year: Not on file    Number of [place] in the Last Year: Not on file    Unstable Housing in the Last Year: Not on file     Review of Systems   Constitutional: Negative for activity change. Respiratory: Negative. Cardiovascular: Negative. Gastrointestinal: Positive for abdominal pain. Genitourinary: Negative.     Musculoskeletal: Positive for permission from your cardiologist or primary care physician. A letter is  required to besent to PCP/Cardiologist regarding holding medications for procedure to decrease bleeding risk.

## 2022-06-01 NOTE — OP NOTE
TRANSFORAMINAL EPIDURAL STEROID INJECTION    6/1/22  The patient was counseled at length about the risks of evelio Covid-19 during their perioperative period and any recovery window from their procedure. The patient was made aware that evelio Covid-19  may worsen their prognosis for recovering from their procedure  and lend to a higher morbidity and/or mortality risk. All material risks, benefits, and reasonable alternatives including postponing the procedure were discussed. The patient does wish to proceed with the procedure at this time. Surgeon: Mg Miller MD    Pre-operative Diagnosis:   Hospital Problems           Last Modified POA    * (Principal) Lumbar radiculitis 5/27/2022 Yes    Lumbar spondylosis 5/27/2022 Yes          Post-operative Diagnosis: Same    Assistants: none    This is a 61 y.o. male patient with pain in the Back, right leg. Previous treatment and examination findings are noted in the H&P.RL4,5 transforaminal epidural injection has been requested for diagnostic and therapeutic reasons. Conservative treatment was ineffective i.e.: ice, NSAIDS, rest, narcotic medication, chiropractic care, physical therapy and message therapy. Patient is unable to perform the following ADL's: ambulating and grooming     Pain Assessment: 0-10  Pain Level: 10     Pain Orientation: Lower  Pain Location: Back  Pain Descriptors: Aching    Last Plain films: 2020      EXAMINATION:  1. RL4,5 transforaminal radiculogram/epidurogram.   2. RL4,5 transforaminal epidural anesthetic injection. 3. RL4,5 transforaminal epidural steroid injection. CONSENT: Written consent was obtained from the patient on preprinted consent form after explaining the procedure, indications, potential complications and outcomes. Alternative treatments were also discussed. DISCUSSION: The patient was sterilely prepped and draped in the usual fashion in the prone position.  Time out was verified for correct patient, side, level and procedure. SEDATION:   No conscious sedation was performed during the procedure. The patient remained awake and conversed throughout the procedure. The patient underwent pulse oximetry and blood pressure monitoring independently by a trained observer, as well as by a physician. PROCEDURE:  Under image-intensifier control, a 22 gauge needle x 5 inch spinal needle was guided successfully into the epidural space employing a posterior lateral/oblique approach. Needle aspiration was negative for heme or CSF. Instillation of  .5 mL of Omnipaque 240 contrast medium opacified the spinal nerve and demonstrated contiguous flow into the R L 4 epidural space. No vascular spread was noted. Digital subtraction was not employed to evaluate for vascular spread. The patient was monitored for any untoward reaction to contrast medium before proceeding with procedure #2. The patient did not report pain reproduction in a concordant distribution. Following needle position verification, a test dose of .5 mL of sterile lidocaine 0.5% was administered and patient monitored for any adverse effects. Then, 1 mL of   was instilled into the epidural space and the patient's response was again monitored. Finally, Dexamethasone (Decadron 10 mg/mL) 1 ml of 0.5% bupivacaine was then instilled. The patient's response was again monitored. The spinal needle was removed. Instillation of  .5 mL of Omnipaque 240 contrast medium opacified the spinal nerve and demonstrated contiguous flow into the RL 5  epidural space. No vascular spread was noted. Digital subtraction was not employed to evaluate for vascular spread. The patient was monitored for any untoward reaction to contrast medium before proceeding with procedure #2. The patient did not report pain reproduction in a concordant distribution.     Following needle position verification, a test dose of .5 mL of sterile lidocaine 0.5% was administered and patient monitored for any adverse effects. Then, 1 mL of   was instilled into the epidural space and the patient's response was again monitored. Finally, Dexamethasone (Decadron 10 mg/mL) 1 ml of 0.5% bupivacaine was then instilled. The patient's response was again monitored. The spinal needle was removed. The patient tolerated the procedure well and without complications and was noted to be in stable condition prior to discharge from the procedure center with discharge instructions. EBL: no blood loss    SPECIMEN: none    IMPRESSIONS:  1. RL4,5 transforaminal epidurogram, epidural anesthesia and epidural steroid injection procedures accomplished without incident. RECOMMENDATIONS:  1. Complete and return Post-Procedure Pain and Activity Diary.   2. Contact the [address] for symptom exacerbation, fever or unusual symptoms. 3. Post-procedure care according to verbal and written discharge instructions    POST-PROCEDURE EPIDUROGRAPHY INTERPRETATION:    EXAMINATION: AP, lateral, and oblique views    FLUORO TIME: 13 seconds    DISCUSSION: Spot views of the spine reveal normal alignment and segmentation. Spinal needle is positioned at the RL4,5 neuroforamin. Contrast spreads and outlines the RL4,5 nerve/ neuroforamin and epidural space. The epidurogram reveals excellent contrast flow. Visualized spine reveals See radiology report. Soft tissues reveal no abnormalities. IMPRESSION: Rl4,5 transforaminal epidurogram/epineurogram reveals satisfactory needle position and contrast spread.      Electronically signed by Kamla Nelson MD on 6/1/2022 at 9:23 AM

## 2025-02-12 NOTE — INTERVAL H&P NOTE
CC:   Chief Complaint   Patient presents with    URI     Patient was in 1/29 and was put on Zpak. Symptoms never went away and Friday symptoms began coming back. Symptoms include cough, wheezing, SOB and headache after coughing fits. He is not sleeping well. He is using albuterol inhaler, Mucinex, Tylenol and Tylenol Sinus scheduled.      History of Present Illness  The patient presents for evaluation of shortness of breath and wheezing.    He reports a temporary improvement in his condition for a few days following the initiation of medication on either Tuesday or Wednesday. However, by Friday night, his symptoms, including rhinorrhea, congestion, cough, and wheezing, had exacerbated. He also experiences sinus pressure, which he manages through drainage. This morning, he experienced severe pain on the left side of his face, a new symptom that he attributes to sleeping on that side. This pain extended to his teeth. He has been performing pressure point massage to facilitate sinus drainage, which provides relief once the sinuses begin to flow. His sleep has been disrupted due to these symptoms. He has a history of sinus issues but does not recall frequent infections. He reports no new exposures. He is able to take deep breaths but experiences wheezing and tightness in the upper part of his lungs. He is scheduled for repeat pulmonary function tests (PFTs) with [facility]. He reports no adverse effects from prednisone. He does not believe he has an infection as he does not feel unwell elsewhere. He works at [employer] and believes his symptoms may be related to his work environment. He wears a mask at work. He has completed a course of Z-Yoel and a 7-day steroid regimen. He recently started using a new albuterol inhaler, which provides some relief.    SOCIAL HISTORY  He works at [employer].    MEDICATIONS  Current: albuterol inhaler, Mucinex DM, Tylenol  Past: prednisone, Z-Yoel     Current Outpatient Medications   Medication  I have interviewed and examined the patient and reviewed the recent History and Physical.  There have been no changes to the recent H&P documentation. The surgical consent form has been signed. Last anticoagulant medication use was:na    Premedication taken for contrast allergy? No    Valium taken for oral sedation? No    Outpatient Medications Marked as Taking for the 2/24/22 encounter [facility] Encounter)   Medication Sig Dispense Refill    metoprolol succinate (TOPROL XL) 25 MG extended release tablet Take 1 tablet by mouth 2 times daily      ASPIRIN 81 PO Take 1 tablet by mouth daily      atorvastatin (LIPITOR) 80 MG tablet Take 80 mg by mouth daily      furosemide (LASIX) 20 MG tablet Take 20 mg by mouth 2 times daily      insulin aspart protamine-insulin aspart (NOVOLOG 70/30 FLEXPEN RELION) (70-30) 100 UNIT/ML injection Inject 8 Units into the skin 3 times daily       insulin glargine (LANTUS) 100 UNIT/ML injection vial Inject 40 Units into the skin daily       isosorbide mononitrate (IMDUR) 60 MG extended release tablet Take 60 mg by mouth daily      magnesium oxide (MAG-OX) 400 MG tablet Take 400 mg by mouth daily      pantoprazole sodium (PROTONIX) 40 MG PACK packet Take 40 mg by mouth every morning (before breakfast)         The patient understands the planned operation and its associated risks and benefits and agrees to proceed.         Electronically signed by Nancy Evans MD on 2/24/2022 at 9:39 AM Sig    predniSONE (DELTASONE) 20 MG tablet Take 3 tablets by mouth daily for 3 days, THEN 2 tablets daily for 3 days, THEN 1 tablet daily for 3 days.    benzonatate (TESSALON PERLES) 200 MG capsule Take 1 capsule by mouth every 8 hours as needed for Cough.    metFORMIN (GLUCOPHAGE-XR) 500 MG 24 hr tablet Take 1 tablet by mouth daily (with breakfast). Please take with largest meal of the day.    albuterol 108 (90 Base) MCG/ACT inhaler Inhale 2 puffs into the lungs every 4 hours as needed for Shortness of Breath or Wheezing.    atorvastatin (LIPITOR) 20 MG tablet Take 1 tablet by mouth daily.    losartan (COZAAR) 25 MG tablet Take 2 tablets by mouth daily.    escitalopram (Lexapro) 10 MG tablet Take 1 tablet by mouth daily.    metoPROLOL succinate (TOPROL-XL) 25 MG 24 hr tablet Take 1 tablet by mouth 2 times daily.    omeprazole (PriLOSEC) 40 MG capsule Take 1 capsule by mouth daily.     No current facility-administered medications for this visit.       EXAM:  Vitals:    02/11/25 1602   BP: (!) 149/97   BP Location: LUE - Left upper extremity   Patient Position: Sitting   Cuff Size: Large Adult   Pulse: 82   Resp: 20   Temp: 97.8 °F (36.6 °C)   TempSrc: Temporal   SpO2: 99%   Weight: (!) 140.6 kg (310 lb)   Height: 5' 8\" (1.727 m)       General: Alert, active, afebrile, and in NAD  Eye: Conjunctiva and sclera are not inflamed  Ear: external ears and canals - normal, TM's w/ normal landmarks, light reflex and mobility  Nose: Clear rhinorrhea  Throat: clear post-nasal drainage present and mmm, normal tonsils w/o erythema, exudates or petechia  Neck: supple  Respiratory: CTA,  crackles or retractions.  Faint upper respiratory wheeze.  Cardiovascular: Regular rate and rhythm. No murmur.       Assessment & Plan  Asthma exacerbation  His symptoms may be attributed to asthma or underlying COPD, leading to significant inflammation. A chest x-ray did not reveal any abnormalities. He reports no new exposures and has a history  of sinus issues but not frequent infections. A repeat course of prednisone will be initiated, starting with 60 mg for 3 days, followed by 40 mg for 3 days, and then 20 mg. Tessalon Perles will be prescribed, to be taken three times daily as needed for cough relief. He is advised to continue using Mucinex DM and Tylenol, and to maintain high fluid intake. He is instructed to monitor his symptoms over the next 2 days. If there is no improvement, he should contact the office for the addition of an antibiotic, potentially doxycycline 100 mg, to be taken twice daily for 10 days. He is also advised to wear a mask at work to reduce exposure to dust and other irritants. A note for work leave has been provided for today and tomorrow.  Follow-up with PCP if symptoms persist.       FRANCISCO Clinton

## (undated) DEVICE — LINE SAMP O2 6.5FT W/FEMALE CONN F/ADULT CAPNOLINE PLUS

## (undated) DEVICE — CHLORAPREP 26ML CLEAR

## (undated) DEVICE — TRAY PAIN CUST

## (undated) DEVICE — BANDAGE ADH DIA7/8IN NAT FLEX-FABRIC WVN FOR WND PROTCT

## (undated) DEVICE — GLOVE SURG SZ 8 L12IN THK91MIL BRN LTX FREE POLYCHLOROPRENE

## (undated) DEVICE — CANNULA RF 10CM LEN 10MM TIP 18GA CRV SHRP NDL

## (undated) DEVICE — GOWN,AURORA,NONREINFORCED,LARGE: Brand: MEDLINE

## (undated) DEVICE — GLOVE ORANGE PI 8   MSG9080

## (undated) DEVICE — NEEDLE, QUINCKE, 22GX5": Brand: MEDLINE

## (undated) DEVICE — GOWN,AURORA,NONRNF,XL,30/CS: Brand: MEDLINE

## (undated) DEVICE — COVER LT HNDL BLU PLAS

## (undated) DEVICE — NEEDLE FLTR 18GA L1.5IN MEM THK5UM BLNT DISP

## (undated) DEVICE — Z DISCONTINUED USE 2651424 COVER EQUIP D18IN CNTOUR ELAS BND SNUG CLSR

## (undated) DEVICE — GLOVE ORANGE PI 7   MSG9070

## (undated) DEVICE — PAD ELECSURG GRND DISP

## (undated) DEVICE — CANNULA NSL AD L2IN ETCO2 SAMP SFT CRUSH RESIST FEM AIRLFE

## (undated) DEVICE — COVER,TABLE,77X90,STERILE: Brand: MEDLINE

## (undated) DEVICE — C-ARMOR C-ARM EQUIPMENT COVERS CLEAR STERILE UNIVERSAL FIT 12 PER CASE: Brand: C-ARMOR

## (undated) DEVICE — SHEET, T, LAPAROTOMY, STERILE: Brand: MEDLINE